# Patient Record
Sex: MALE | Race: BLACK OR AFRICAN AMERICAN | NOT HISPANIC OR LATINO | Employment: FULL TIME | URBAN - METROPOLITAN AREA
[De-identification: names, ages, dates, MRNs, and addresses within clinical notes are randomized per-mention and may not be internally consistent; named-entity substitution may affect disease eponyms.]

---

## 2018-09-05 ENCOUNTER — OFFICE VISIT (OUTPATIENT)
Dept: URGENT CARE | Facility: CLINIC | Age: 48
End: 2018-09-05
Payer: COMMERCIAL

## 2018-09-05 VITALS
DIASTOLIC BLOOD PRESSURE: 78 MMHG | WEIGHT: 234.2 LBS | RESPIRATION RATE: 18 BRPM | OXYGEN SATURATION: 97 % | HEIGHT: 75 IN | TEMPERATURE: 98.4 F | BODY MASS INDEX: 29.12 KG/M2 | SYSTOLIC BLOOD PRESSURE: 132 MMHG | HEART RATE: 71 BPM

## 2018-09-05 DIAGNOSIS — M54.50 ACUTE BILATERAL LOW BACK PAIN WITHOUT SCIATICA: Primary | ICD-10-CM

## 2018-09-05 PROCEDURE — G0382 LEV 3 HOSP TYPE B ED VISIT: HCPCS | Performed by: PHYSICIAN ASSISTANT

## 2018-09-05 RX ORDER — CYCLOBENZAPRINE HCL 10 MG
10 TABLET ORAL 3 TIMES DAILY PRN
Qty: 20 TABLET | Refills: 0 | Status: SHIPPED | OUTPATIENT
Start: 2018-09-05 | End: 2019-12-27 | Stop reason: SDUPTHER

## 2018-09-05 RX ORDER — IBUPROFEN 600 MG/1
600 TABLET ORAL EVERY 6 HOURS PRN
Qty: 20 TABLET | Refills: 0 | Status: SHIPPED | OUTPATIENT
Start: 2018-09-05

## 2018-09-05 NOTE — PATIENT INSTRUCTIONS
1  Low back pain  -X-ray as reviewed by myself is negative for acute abnormality- if the radiology read differs I will call you  -Apply warm, moist heat  -Gentle stretching  -Use Ibuprofen every 6-8 hours with food  -Use muscle relaxer as directed- do not drive or work while taking this  -Follow-up with PCP within 1 week    Go to ER with worsening symptoms, worsening pain, fever, numbness/tingling, bowel/bladder incontinence, weakness, or any new concerns    Acute Low Back Pain   AMBULATORY CARE:   Acute low back pain  is sudden discomfort in your lower back area that lasts for up to 6 weeks  The discomfort makes it difficult to tolerate activity  Common symptoms include the following:   · Back stiffness or spasms    · Pain down the back or side of one leg    · Holding yourself in an unusual position or posture to decrease your back pain    · Not being able to find a sitting position that is comfortable    · Slow increase in your pain for 24 to 48 hours after you stress your back    · Tenderness on your lower back or severe pain when you move your back  Seek immediate care for the following symptoms:   · Severe pain    · Sudden stiffness and heaviness in both buttocks down to both legs    · Numbness or weakness in one leg, or pain in both legs    · Numbness in your genital area or across your lower back    · Unable to control your urine or bowel movements  Contact your healthcare provider if:   · You have a fever  · You have pain at night or when you rest     · Your pain does not get better with treatment  · You have pain that worsens when you cough or sneeze  · You suddenly feel something pop or snap in your back  · You have questions or concerns about your condition or care  The goal of treatment for acute low back pain  is to relieve your pain and help you tolerate activity  Most people with acute lower back pain get better within 4 to 6 weeks   You may need any of the following:  · Acetaminophen  decreases pain  It is available without a doctor's order  Ask how much to take and how often to take it  Follow directions  Acetaminophen can cause liver damage if not taken correctly  · NSAIDs  help decrease swelling and pain  This medicine is available with or without a doctor's order  NSAIDs can cause stomach bleeding or kidney problems in certain people  If you take blood thinner medicine, always ask your healthcare provider if NSAIDs are safe for you  Always read the medicine label and follow directions  · Prescription pain medicine  may be given  Ask your healthcare provider how to take this medicine safely  · Muscle relaxers  decrease pain by relaxing the muscles in your lower spine  Manage your symptoms:   · Stay active  as much as you can without causing more pain  Bed rest could make your back pain worse  Start with some light exercises such as walking  Avoid heavy lifting until your pain is gone  Ask for more information about the activities or exercises that are right for you  · Ice  helps decrease swelling, pain, and muscle spams  Put crushed ice in a plastic bag  Cover it with a towel  Place it on your lower back for 20 to 30 minutes every 2 hours  Do this for about 2 to 3 days after your pain starts, or as directed  · Heat  helps decrease pain and muscle spasms  Start to use heat after treatment with ice has stopped  Use a small towel dampened with warm water or a heating pad, or sit in a warm bath  Apply heat on the area for 20 to 30 minutes every 2 hours for as many days as directed  Alternate heat and ice  Prevent acute low back pain:   · Use proper body mechanics  ¨ Bend at the hips and knees when you  objects  Do not bend from the waist  Use your leg muscles as you lift the load  Do not use your back  Keep the object close to your chest as you lift it   Try not to twist or lift anything above your waist     ¨ Change your position often when you stand for long periods of time  Rest one foot on a small box or footrest, and then switch to the other foot often  ¨ Try not to sit for long periods of time  When you do, sit in a straight-backed chair with your feet flat on the floor  Never reach, pull, or push while you are sitting  · Do exercises that strengthen your back muscles  Warm up before you exercise  Ask your healthcare provider the best exercises for you  · Maintain a healthy weight  Ask your healthcare provider how much you should weigh  Ask him to help you create a weight loss plan if you are overweight  Follow up with your healthcare provider as directed:  Return for a follow-up visit if you still have pain after 1 to 3 weeks of treatment  You may need to visit an orthopedist if your back pain lasts more than 12 weeks  Write down your questions so you remember to ask them during your visits  © 2017 [employer] Information is for End User's use only and may not be sold, redistributed or otherwise used for commercial purposes  All illustrations and images included in CareNotes® are the copyrighted property of A D [employer] M [employer]  or James Olivo  The above information is an  only  It is not intended as medical advice for individual conditions or treatments  Talk to your doctor, nurse or pharmacist before following any medical regimen to see if it is safe and effective for you
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
## 2019-10-10 ENCOUNTER — OFFICE VISIT (OUTPATIENT)
Dept: GASTROENTEROLOGY | Facility: CLINIC | Age: 49
End: 2019-10-10
Payer: COMMERCIAL

## 2019-10-10 VITALS
SYSTOLIC BLOOD PRESSURE: 130 MMHG | BODY MASS INDEX: 30.34 KG/M2 | HEART RATE: 78 BPM | WEIGHT: 244 LBS | HEIGHT: 75 IN | DIASTOLIC BLOOD PRESSURE: 70 MMHG

## 2019-10-10 DIAGNOSIS — R10.9 ABDOMINAL PAIN, UNSPECIFIED ABDOMINAL LOCATION: ICD-10-CM

## 2019-10-10 DIAGNOSIS — K64.9 HEMORRHOIDS, UNSPECIFIED HEMORRHOID TYPE: Primary | ICD-10-CM

## 2019-10-10 PROCEDURE — 99214 OFFICE O/P EST MOD 30 MIN: CPT | Performed by: PHYSICIAN ASSISTANT

## 2019-10-10 RX ORDER — OMEPRAZOLE 40 MG/1
40 CAPSULE, DELAYED RELEASE ORAL DAILY
COMMUNITY
Start: 2019-04-03

## 2019-10-10 RX ORDER — ALBUTEROL SULFATE 90 UG/1
2 AEROSOL, METERED RESPIRATORY (INHALATION) EVERY 6 HOURS PRN
COMMUNITY
Start: 2019-03-29 | End: 2020-03-28

## 2019-10-10 RX ORDER — HYDROCORTISONE ACETATE 25 MG/1
25 SUPPOSITORY RECTAL 2 TIMES DAILY
Qty: 12 SUPPOSITORY | Refills: 0 | Status: SHIPPED | OUTPATIENT
Start: 2019-10-10

## 2019-10-10 RX ORDER — IBUPROFEN 800 MG/1
800 TABLET ORAL
Refills: 0 | COMMUNITY
Start: 2019-08-30

## 2019-10-10 NOTE — PATIENT INSTRUCTIONS
Hemorrhoids   WHAT YOU NEED TO KNOW:   Hemorrhoids are swollen blood vessels inside your rectum (internal hemorrhoids) or on your anus (external hemorrhoids)  Sometimes a hemorrhoid may prolapse  This means it extends out of your anus  DISCHARGE INSTRUCTIONS:   Return to the emergency department if:   · You have severe pain in your rectum or around your anus  · You have severe pain in your abdomen and you are vomiting  · You have bleeding from your anus that soaks through your underwear  Contact your healthcare provider if:   · You have frequent and painful bowel movements  · Your hemorrhoid looks or feels more swollen than usual      · You do not have a bowel movement for 2 days or more  · You see or feel tissue coming through your anus  · You have questions or concerns about your condition or care  Medicines: You may  need any of the following:  · A pad, cream, or ointment  can help decrease pain, swelling, and itching  · Stool softeners  help treat or prevent constipation  · NSAIDs , such as ibuprofen, help decrease swelling, pain, and fever  NSAIDs can cause stomach bleeding or kidney problems in certain people  If you take blood thinner medicine, always ask your healthcare provider if NSAIDs are safe for you  Always read the medicine label and follow directions  · Take your medicine as directed  Contact your healthcare provider if you think your medicine is not helping or if you have side effects  Tell him or her if you are allergic to any medicine  Keep a list of the medicines, vitamins, and herbs you take  Include the amounts, and when and why you take them  Bring the list or the pill bottles to follow-up visits  Carry your medicine list with you in case of an emergency  Manage your symptoms:   · Apply ice on your anus for 15 to 20 minutes every hour or as directed  Use an ice pack, or put crushed ice in a plastic bag   Cover it with a towel before you apply it to your anus  Ice helps prevent tissue damage and decreases swelling and pain  · Take a sitz bath  Fill a bathtub with 4 to 6 inches of warm water  You may also use a sitz bath pan that fits inside a toilet bowl  Sit in the sitz bath for 15 minutes  Do this 3 times a day, and after each bowel movement  The warm water can help decrease pain and swelling  · Keep your anal area clean  Gently wash the area with warm water daily  Soap may irritate the area  After a bowel movement, wipe with moist towelettes or wet toilet paper  Dry toilet paper can irritate the area  Prevent hemorrhoids:   · Do not strain to have a bowel movement  Do not sit on the toilet too long  These actions can increase pressure on the tissues in your rectum and anus  · Drink plenty of liquids  Liquids can help prevent constipation  Ask how much liquid to drink each day and which liquids are best for you  · Eat a variety of high-fiber foods  Examples include fruits, vegetables, and whole grains  Ask your healthcare provider how much fiber you need each day  You may need to take a fiber supplement  · Exercise as directed  Exercise, such as walking, may make it easier to have a bowel movement  Ask your healthcare provider to help you create an exercise plan  · Do not have anal sex  Anal sex can weaken the skin around your rectum and anus  · Avoid heavy lifting  This can cause straining and increase your risk for another hemorrhoid  Follow up with your healthcare provider as directed:  Write down your questions so you remember to ask them during your visits  © 2017 [facility] [facility] Information is for End User's use only and may not be sold, redistributed or otherwise used for commercial purposes  All illustrations and images included in CareNotes® are the copyrighted property of [employer]  or James Olivo  The above information is an  only   It is not intended as medical advice for individual conditions or treatments  Talk to your doctor, nurse or pharmacist before following any medical regimen to see if it is safe and effective for you

## 2019-10-10 NOTE — PROGRESS NOTES
[facility] - Outpatient Follow-up Note  Jesús Serrano 57 y o  male MRN: [id number]  Encounter: [id number]          ASSESSMENT AND PLAN:      1  Hemorrhoids, unspecified hemorrhoid type  2  Abdominal pain, unspecified abdominal location  Will start Anusol suppositories q h s  For 14 days  Will start Anusol cream twice a day for 14 days  Will get CT abdomen pelvis with contrast due to the fact the patient has been having ongoing abdominal pain radiating to the pelvic region with a negative ultrasound  No repeat plans for colonoscopy   ______________________________________________________________________    SUBJECTIVE:    57-year-old male who is here with a history of hemorrhoids as well as abdominal pain  Patient underwent colonoscopy last year when he was evaluated by our practice and colonoscopy was normal   Patient reports that over the last several months he has been having were symptoms related to his hemorrhoids as well as periumbilical abdominal pain  Patient reports that when I had prescribed him treatment for his hemorrhoids last year he did not go through with this treatment  He denies any bleeding  He does report rectal pain and irritation  He does report that he spends a lot of time sitting on the toilet  He denies any significant diarrhea or constipation problems  He denies any nausea or vomiting  He does report that this periumbilical abdominal pain radiates to pelvic area  He reports that there is no correlation to the cause of his pain  REVIEW OF SYSTEMS IS OTHERWISE NEGATIVE        Historical Information   Past Medical History:   Diagnosis Date    Asthma      Past Surgical History:   Procedure Laterality Date    COLONOSCOPY       Social History   Social History     Substance and Sexual Activity   Alcohol Use Yes    Comment: socially     Social History     Substance and Sexual Activity   Drug Use No     Social History     Tobacco Use   Smoking Status Never Smoker   Smokeless Tobacco Never Used     Family History   Problem Relation Age of Onset    Diabetes Mother     Cancer Father        Meds/Allergies       Current Outpatient Medications:     albuterol (PROVENTIL HFA,VENTOLIN HFA) 90 mcg/act inhaler    cyclobenzaprine (FLEXERIL) 10 mg tablet    hydrocortisone (ANUSOL-HC) 25 mg suppository    hydrocortisone (ANUSOL-HC, PROCTOSOL HC,) 2 5 % rectal cream    ibuprofen (MOTRIN) 600 mg tablet    ibuprofen (MOTRIN) 800 mg tablet    omeprazole (PriLOSEC) 40 MG capsule    No Known Allergies        Objective     Blood pressure 130/70, pulse 78, height 6' 3" (1 905 m), weight 111 kg (244 lb)  Body mass index is 30 5 kg/m²  PHYSICAL EXAM:      General Appearance:   Alert, cooperative, no distress   HEENT:   Normocephalic, atraumatic, anicteric      Neck:  Supple, symmetrical, trachea midline   Lungs:   Clear to auscultation bilaterally; no rales, rhonchi or wheezing; respirations unlabored    Heart[de-identified]   Regular rate and rhythm; no murmur, rub, or gallop  Abdomen:   Soft, non-tender, non-distended; normal bowel sounds; no masses, no organomegaly    Genitalia:   Deferred    Rectal:   Deferred    Extremities:  No cyanosis, clubbing or edema    Pulses:  2+ and symmetric    Skin:  No jaundice, rashes, or lesions    Lymph nodes:  No palpable cervical lymphadenopathy        Lab Results:   No visits with results within 1 Day(s) from this visit  Latest known visit with results is:   No results found for any previous visit  Radiology Results:   No results found

## 2019-10-15 ENCOUNTER — TELEPHONE (OUTPATIENT)
Dept: GASTROENTEROLOGY | Facility: CLINIC | Age: 49
End: 2019-10-15

## 2019-10-15 NOTE — TELEPHONE ENCOUNTER
Called ins to get precert on the CT scan and ins is requiring additional clinicals or peer to peer  Requested US from [facility]  Faxed US and office notes to [employer] Fax# [phone]  Pending response

## 2019-10-16 NOTE — TELEPHONE ENCOUNTER
[facility] denied CT scan  If you would like to provide necessary info by phone , please contact MARCEL at [phone] by 10/17/19 at 5 pm     Please advise   Ty

## 2019-10-16 NOTE — TELEPHONE ENCOUNTER
Lets please cancel the CT for now and see how he does in the next week with treatment provided Thanks  He will likely need a repeat abdominal and pelvic US as well  Thanks

## 2019-10-16 NOTE — TELEPHONE ENCOUNTER
Cancelled CT  Called LMURMILA advising the below  Also told him to call us back in 1 week or 2 to let us know how he is doing

## 2019-10-21 NOTE — TELEPHONE ENCOUNTER
[facility] called lmom - Did we want to cancel CT Scan Abd/contrast for patient  Still showing active  If so, please call and let them know, also does patient know   Please call [phone]

## 2019-11-05 ENCOUNTER — TELEPHONE (OUTPATIENT)
Dept: GASTROENTEROLOGY | Facility: CLINIC | Age: 49
End: 2019-11-05

## 2019-11-05 NOTE — TELEPHONE ENCOUNTER
Pt is requesting an explanation why the CT was denied  [employer] wants to know if we are going to do a peer to peer  If so, please call [phone], ext [phone]  Otherwise please advise alternative approach  Thanks

## 2019-11-05 NOTE — TELEPHONE ENCOUNTER
Luis E pt - [facility] called, CT was denied, [employer] wants to know if we are going to try to re-request with new info  Pt wants to know why it was denied  Please call [phone], ext [phone]   Ty

## 2019-11-05 NOTE — TELEPHONE ENCOUNTER
I called [employer] and left a message for call back todo a peer to peer  Please inform patient that we are working on this Thanks!

## 2019-11-08 ENCOUNTER — TELEPHONE (OUTPATIENT)
Dept: GASTROENTEROLOGY | Facility: CLINIC | Age: 49
End: 2019-11-08

## 2019-11-08 NOTE — TELEPHONE ENCOUNTER
Luis E pt-  Patient would like to know the status of the Cat Scan and or if, another procedure will be ordered since the insurance denied the initial request  Please phone patient @ [phone] to advise

## 2019-11-08 NOTE — TELEPHONE ENCOUNTER
LMOM advised Ct scan insurance denied but we can do an US and to let us know and if they are feeling better/

## 2019-11-12 ENCOUNTER — TELEPHONE (OUTPATIENT)
Dept: GASTROENTEROLOGY | Facility: CLINIC | Age: 49
End: 2019-11-12

## 2019-11-12 NOTE — TELEPHONE ENCOUNTER
Pt called about Ct scan--explained it was denied and we can do US pt said he had an US and nothing found    Pt will call insurance

## 2019-12-27 DIAGNOSIS — M54.50 ACUTE BILATERAL LOW BACK PAIN WITHOUT SCIATICA: ICD-10-CM

## 2019-12-27 DIAGNOSIS — K64.9 HEMORRHOIDS, UNSPECIFIED HEMORRHOID TYPE: ICD-10-CM

## 2019-12-27 RX ORDER — CYCLOBENZAPRINE HCL 10 MG
10 TABLET ORAL 3 TIMES DAILY PRN
Qty: 20 TABLET | Refills: 0 | Status: SHIPPED | OUTPATIENT
Start: 2019-12-27

## 2019-12-27 NOTE — TELEPHONE ENCOUNTER
Luis E pt - pt would like an order for an US to be put in so that he can get it done, please call pt when it is in the system so he can schedule it  Pt also needs a refill of the rectal cream and felxeril called into [facility] in [facility]

## 2020-04-15 ENCOUNTER — TELEPHONE (OUTPATIENT)
Dept: GASTROENTEROLOGY | Facility: CLINIC | Age: 50
End: 2020-04-15

## 2020-04-20 ENCOUNTER — TELEMEDICINE (OUTPATIENT)
Dept: GASTROENTEROLOGY | Facility: CLINIC | Age: 50
End: 2020-04-20
Payer: COMMERCIAL

## 2020-04-20 DIAGNOSIS — R10.9 ABDOMINAL PAIN, UNSPECIFIED ABDOMINAL LOCATION: Primary | ICD-10-CM

## 2020-04-20 DIAGNOSIS — K64.9 HEMORRHOIDS, UNSPECIFIED HEMORRHOID TYPE: ICD-10-CM

## 2020-04-20 PROCEDURE — 99443 PR PHYS/QHP TELEPHONE EVALUATION 21-30 MIN: CPT | Performed by: PHYSICIAN ASSISTANT

## 2020-04-20 RX ORDER — DICYCLOMINE HCL 20 MG
20 TABLET ORAL EVERY 6 HOURS
Qty: 40 TABLET | Refills: 2 | Status: SHIPPED | OUTPATIENT
Start: 2020-04-20

## 2020-05-14 ENCOUNTER — APPOINTMENT (OUTPATIENT)
Dept: URGENT CARE | Facility: CLINIC | Age: 50
End: 2020-05-14
Payer: OTHER MISCELLANEOUS

## 2020-05-14 ENCOUNTER — APPOINTMENT (OUTPATIENT)
Dept: RADIOLOGY | Facility: CLINIC | Age: 50
End: 2020-05-14
Payer: OTHER MISCELLANEOUS

## 2020-05-14 ENCOUNTER — TRANSCRIBE ORDERS (OUTPATIENT)
Dept: URGENT CARE | Facility: CLINIC | Age: 50
End: 2020-05-14

## 2020-05-14 DIAGNOSIS — S89.92XA INJURY OF LEFT LOWER EXTREMITY, INITIAL ENCOUNTER: Primary | ICD-10-CM

## 2020-05-14 DIAGNOSIS — S89.92XA INJURY OF LEFT LOWER EXTREMITY, INITIAL ENCOUNTER: ICD-10-CM

## 2020-05-14 PROCEDURE — 73610 X-RAY EXAM OF ANKLE: CPT

## 2020-05-14 PROCEDURE — 73590 X-RAY EXAM OF LOWER LEG: CPT

## 2020-05-14 PROCEDURE — 99283 EMERGENCY DEPT VISIT LOW MDM: CPT

## 2020-05-14 PROCEDURE — G0382 LEV 3 HOSP TYPE B ED VISIT: HCPCS

## 2020-05-18 ENCOUNTER — OFFICE VISIT (OUTPATIENT)
Dept: OBGYN CLINIC | Facility: CLINIC | Age: 50
End: 2020-05-18
Payer: OTHER MISCELLANEOUS

## 2020-05-18 VITALS
DIASTOLIC BLOOD PRESSURE: 82 MMHG | TEMPERATURE: 98.7 F | BODY MASS INDEX: 30.5 KG/M2 | SYSTOLIC BLOOD PRESSURE: 122 MMHG | HEIGHT: 75 IN

## 2020-05-18 DIAGNOSIS — S82.392A CLOSED FRACTURE OF POSTERIOR MALLEOLUS OF LEFT TIBIA, INITIAL ENCOUNTER: Primary | ICD-10-CM

## 2020-05-18 PROCEDURE — 99203 OFFICE O/P NEW LOW 30 MIN: CPT | Performed by: ORTHOPAEDIC SURGERY

## 2020-05-18 PROCEDURE — 27767 CLTX POST ANKLE FX: CPT | Performed by: ORTHOPAEDIC SURGERY

## 2020-05-21 ENCOUNTER — TELEPHONE (OUTPATIENT)
Dept: OBGYN CLINIC | Facility: CLINIC | Age: 50
End: 2020-05-21

## 2020-05-26 ENCOUNTER — TELEPHONE (OUTPATIENT)
Dept: OBGYN CLINIC | Facility: CLINIC | Age: 50
End: 2020-05-26

## 2020-06-02 ENCOUNTER — OFFICE VISIT (OUTPATIENT)
Dept: OBGYN CLINIC | Facility: CLINIC | Age: 50
End: 2020-06-02

## 2020-06-02 ENCOUNTER — APPOINTMENT (OUTPATIENT)
Dept: RADIOLOGY | Facility: CLINIC | Age: 50
End: 2020-06-02
Payer: OTHER MISCELLANEOUS

## 2020-06-02 VITALS — SYSTOLIC BLOOD PRESSURE: 118 MMHG | DIASTOLIC BLOOD PRESSURE: 72 MMHG

## 2020-06-02 DIAGNOSIS — S82.392A CLOSED FRACTURE OF POSTERIOR MALLEOLUS OF LEFT TIBIA, INITIAL ENCOUNTER: ICD-10-CM

## 2020-06-02 DIAGNOSIS — S82.392D CLOSED FRACTURE OF POSTERIOR MALLEOLUS OF LEFT TIBIA WITH ROUTINE HEALING, SUBSEQUENT ENCOUNTER: Primary | ICD-10-CM

## 2020-06-02 PROCEDURE — 99024 POSTOP FOLLOW-UP VISIT: CPT | Performed by: ORTHOPAEDIC SURGERY

## 2020-06-02 PROCEDURE — 73610 X-RAY EXAM OF ANKLE: CPT

## 2020-06-30 ENCOUNTER — APPOINTMENT (OUTPATIENT)
Dept: RADIOLOGY | Facility: CLINIC | Age: 50
End: 2020-06-30
Payer: OTHER MISCELLANEOUS

## 2020-06-30 ENCOUNTER — OFFICE VISIT (OUTPATIENT)
Dept: OBGYN CLINIC | Facility: CLINIC | Age: 50
End: 2020-06-30

## 2020-06-30 VITALS
TEMPERATURE: 98.7 F | BODY MASS INDEX: 30 KG/M2 | DIASTOLIC BLOOD PRESSURE: 68 MMHG | SYSTOLIC BLOOD PRESSURE: 120 MMHG | WEIGHT: 240 LBS

## 2020-06-30 DIAGNOSIS — S82.392D CLOSED FRACTURE OF POSTERIOR MALLEOLUS OF LEFT TIBIA WITH ROUTINE HEALING, SUBSEQUENT ENCOUNTER: Primary | ICD-10-CM

## 2020-06-30 DIAGNOSIS — S82.392D CLOSED FRACTURE OF POSTERIOR MALLEOLUS OF LEFT TIBIA WITH ROUTINE HEALING, SUBSEQUENT ENCOUNTER: ICD-10-CM

## 2020-06-30 PROCEDURE — 99024 POSTOP FOLLOW-UP VISIT: CPT | Performed by: ORTHOPAEDIC SURGERY

## 2020-06-30 PROCEDURE — 73610 X-RAY EXAM OF ANKLE: CPT

## 2020-07-09 ENCOUNTER — EVALUATION (OUTPATIENT)
Dept: PHYSICAL THERAPY | Age: 50
End: 2020-07-09
Payer: OTHER MISCELLANEOUS

## 2020-07-09 DIAGNOSIS — S82.392D CLOSED FRACTURE OF POSTERIOR MALLEOLUS OF LEFT TIBIA WITH ROUTINE HEALING, SUBSEQUENT ENCOUNTER: Primary | ICD-10-CM

## 2020-07-09 PROCEDURE — 97022 WHIRLPOOL THERAPY: CPT | Performed by: PHYSICAL THERAPIST

## 2020-07-09 PROCEDURE — 97161 PT EVAL LOW COMPLEX 20 MIN: CPT | Performed by: PHYSICAL THERAPIST

## 2020-07-09 PROCEDURE — 97140 MANUAL THERAPY 1/> REGIONS: CPT | Performed by: PHYSICAL THERAPIST

## 2020-07-09 PROCEDURE — 97110 THERAPEUTIC EXERCISES: CPT | Performed by: PHYSICAL THERAPIST

## 2020-07-15 ENCOUNTER — OFFICE VISIT (OUTPATIENT)
Dept: PHYSICAL THERAPY | Age: 50
End: 2020-07-15
Payer: OTHER MISCELLANEOUS

## 2020-07-15 DIAGNOSIS — S82.392D CLOSED FRACTURE OF POSTERIOR MALLEOLUS OF LEFT TIBIA WITH ROUTINE HEALING, SUBSEQUENT ENCOUNTER: Primary | ICD-10-CM

## 2020-07-15 PROCEDURE — 97110 THERAPEUTIC EXERCISES: CPT

## 2020-07-15 PROCEDURE — 97022 WHIRLPOOL THERAPY: CPT

## 2020-07-15 NOTE — PROGRESS NOTES
Daily Note     Today's date: 7/15/2020  Patient name: Leonie Camacho  : 1970  MRN: [id number]  Referring provider: Eugene Ulrich MD  Dx:   Encounter Diagnosis     ICD-10-CM    1  Closed fracture of posterior malleolus of left tibia with routine healing, subsequent encounter S82 392D        Start Time: 1100  Stop Time: 1200  Total time in clinic (min): 60 minutes    Subjective: Reports 7/10 pain at R ankle       Objective: See treatment diary below      Assessment: Tolerated treatment well  Discomfort noted with PROM and active exercises involving ankle DF and eversion, but tolerable  Decreased ROM noted t/o L ankle  Good understanding of exercises, provided written HEP  No c/o increased pain post session  Patient would benefit from continued PT      Plan: Continue per plan of care        Precautions: standard      Manuals 7/9 7/15           Left ankle foot 10' 10'                                     Neuro Re-Ed                                                                 Ther Ex             Towel curls 10x HEP           Towel slides inv/ever  20x                         AROM all planes 10x 20x           slant L2  30"/3 L2 30''x3           Disc fwrd/bwrd 10x 20x           Disc CW/CCW 10x 20x           isometrics  10''x10           Bike 5 6'                        Ther Activity                                       Gait Training                                       Modalities             FWP 10 10'           Ice post PRN             HEP: ankle isometrics, towel slides inv/ever, step calf stretch

## 2020-07-16 NOTE — PROGRESS NOTES
Daily Note     Today's date: 2020  Patient name: Louie Allen  : 1970  MRN: [id number]  Referring provider: Zeyad Crawford MD  Dx:   Encounter Diagnosis     ICD-10-CM    1  Closed fracture of posterior malleolus of left tibia with routine healing, subsequent encounter S82 392D        Start Time: 0800  Stop Time: 0900  Total time in clinic (min): 60 minutes    Subjective: "It is getting a little better " Notes doing HEP with fair tolerance  Objective: See treatment diary below      Assessment: Tolerated treatment well  Patient would benefit from continued PT Ambulation without assistive device  Continues with decreased push off through stance  Tender mid lateral calf  Better AROM through range  Weak to manual resistance  Plan: Continue per plan of care        Precautions: standard      Manuals 7/9 7/15 7/17          Left ankle foot 10' 10' 10'                                    Neuro Re-Ed                                                                 Ther Ex             Towel curls 10x HEP 10x          Towel slides inv/ever  20x  20x                       AROM all planes 10x 20x 20x          slant L2  30"/3 L2 30''x3 3x          Disc fwrd/bwrd 10x 20x 30x          Disc CW/CCW 10x 20x 30x          isometrics  10''x10 MRE  10x          Bike 5 6'                        SLS L   10"/  10x          Calf raises   10x          Ther Activity                                       Gait Training                                       Modalities             FWP 10 10' 10          Ice post PRN             HEP: ankle isometrics, towel slides inv/ever, step calf stretch

## 2020-07-17 ENCOUNTER — OFFICE VISIT (OUTPATIENT)
Dept: PHYSICAL THERAPY | Age: 50
End: 2020-07-17
Payer: OTHER MISCELLANEOUS

## 2020-07-17 DIAGNOSIS — S82.392D CLOSED FRACTURE OF POSTERIOR MALLEOLUS OF LEFT TIBIA WITH ROUTINE HEALING, SUBSEQUENT ENCOUNTER: Primary | ICD-10-CM

## 2020-07-17 PROCEDURE — 97110 THERAPEUTIC EXERCISES: CPT | Performed by: PHYSICAL THERAPIST

## 2020-07-17 PROCEDURE — 97022 WHIRLPOOL THERAPY: CPT | Performed by: PHYSICAL THERAPIST

## 2020-07-17 PROCEDURE — 97140 MANUAL THERAPY 1/> REGIONS: CPT | Performed by: PHYSICAL THERAPIST

## 2020-07-21 ENCOUNTER — APPOINTMENT (OUTPATIENT)
Dept: PHYSICAL THERAPY | Age: 50
End: 2020-07-21
Payer: OTHER MISCELLANEOUS

## 2020-07-22 ENCOUNTER — OFFICE VISIT (OUTPATIENT)
Dept: PHYSICAL THERAPY | Age: 50
End: 2020-07-22
Payer: OTHER MISCELLANEOUS

## 2020-07-22 DIAGNOSIS — S82.392D CLOSED FRACTURE OF POSTERIOR MALLEOLUS OF LEFT TIBIA WITH ROUTINE HEALING, SUBSEQUENT ENCOUNTER: Primary | ICD-10-CM

## 2020-07-22 PROCEDURE — 97140 MANUAL THERAPY 1/> REGIONS: CPT | Performed by: PHYSICAL THERAPIST

## 2020-07-22 PROCEDURE — 97110 THERAPEUTIC EXERCISES: CPT | Performed by: PHYSICAL THERAPIST

## 2020-07-22 PROCEDURE — 97022 WHIRLPOOL THERAPY: CPT | Performed by: PHYSICAL THERAPIST

## 2020-07-22 NOTE — PROGRESS NOTES
Daily Note     Today's date: 2020  Patient name: Merlin Baker  : 1970  MRN: [id number]  Referring provider: Jimmye Dakin, MD  Dx:   Encounter Diagnosis     ICD-10-CM    1  Closed fracture of posterior malleolus of left tibia with routine healing, subsequent encounter S82 392D        Start Time: 4668  Stop Time: 9018  Total time in clinic (min): 61 minutes    Subjective: Patient notes pain persists and varies depending on activity level  Has been trying to walk more at home  Notes guarded to walking in grass and on uneven ground  Objective: See treatment diary below      Assessment: Tolerated treatment well  Limited AROM and guarded to end range PROM  Antalgic gait with decreased push off  Pain persists  Patient would benefit from continued PT  Calf atrophy left versus right  Plan: Continue per plan of care        Precautions: standard      Manuals 7/9 7/15 7/17 7/22         Left ankle foot 10' 10' 10' 10                                   Neuro Re-Ed                                                                 Ther Ex             Towel curls 10x HEP 10x 10x         Towel slides inv/ever  20x  20x 20x                      AROM all planes 10x 20x 20x 20x         slant L2  30"/3 L2 30''x3 3x 4x         Disc fwrd/bwrd 10x 20x 30x 30         Disc CW/CCW 10x 20x 30x 30         isometrics  10''x10 MRE  10x 10x         Bike 5 6'  10                      SLS L   10"/  10x 10"/10x         Calf raises   10x 20x         Ther Activity                                       Gait Training                                       Modalities             FWP 10 10' 10 10         Ice post PRN             HEP: ankle isometrics, towel slides inv/ever, step calf stretch

## 2020-07-24 ENCOUNTER — OFFICE VISIT (OUTPATIENT)
Dept: PHYSICAL THERAPY | Age: 50
End: 2020-07-24
Payer: OTHER MISCELLANEOUS

## 2020-07-24 DIAGNOSIS — S82.392D CLOSED FRACTURE OF POSTERIOR MALLEOLUS OF LEFT TIBIA WITH ROUTINE HEALING, SUBSEQUENT ENCOUNTER: Primary | ICD-10-CM

## 2020-07-24 PROCEDURE — 97140 MANUAL THERAPY 1/> REGIONS: CPT | Performed by: PHYSICAL THERAPIST

## 2020-07-24 PROCEDURE — 97022 WHIRLPOOL THERAPY: CPT | Performed by: PHYSICAL THERAPIST

## 2020-07-24 PROCEDURE — 97110 THERAPEUTIC EXERCISES: CPT | Performed by: PHYSICAL THERAPIST

## 2020-07-24 NOTE — PROGRESS NOTES
Daily Note     Today's date: 2020  Patient name: Denny Fontaine  : 1970  MRN: [id number]  Referring provider: Hannah Ramirez MD  Dx:   Encounter Diagnosis     ICD-10-CM    1  Closed fracture of posterior malleolus of left tibia with routine healing, subsequent encounter S82 392D        Start Time: 1300  Stop Time: 1400  Total time in clinic (min): 60 minutes    Subjective: Patient notes feeling a little less pain today  Riding bike at home  Objective: See treatment diary below      Assessment: Tolerated treatment well  Patient would benefit from continued PT Antalgic gait with decreased push off  Calf raises difficult  Pain persists  Minimal to no edema left ankle  Plan: Continue per plan of care        Precautions: standard      Manuals 7/9 7/15 7/17 7/22 7/24        Left ankle foot 10' 10' 10' 10 10                                  Neuro Re-Ed                                                                 Ther Ex             Towel curls 10x HEP 10x 10x 10x        Towel slides inv/ever  20x  20x 20x 20x                     AROM all planes 10x 20x 20x 20x 20x        slant L2  30"/3 L2 30''x3 3x 4x 4x        Disc fwrd/bwrd 10x 20x 30x 30 30        Disc CW/CCW 10x 20x 30x 30 30        isometrics  10''x10 MRE  10x 10x 10x  MRE        Bike 5 6'  10 10                     SLS L   10"/  10x 10"/10x 10"/10  Blue mat        Calf raises   10x 20x 30x        Ther Activity                                       Gait Training                                       Modalities             FWP 10 10' 10 10 10        Ice post PRN             HEP: ankle isometrics, towel slides inv/ever, step calf stretch

## 2020-07-28 ENCOUNTER — OFFICE VISIT (OUTPATIENT)
Dept: PHYSICAL THERAPY | Age: 50
End: 2020-07-28
Payer: OTHER MISCELLANEOUS

## 2020-07-28 DIAGNOSIS — S82.392D CLOSED FRACTURE OF POSTERIOR MALLEOLUS OF LEFT TIBIA WITH ROUTINE HEALING, SUBSEQUENT ENCOUNTER: Primary | ICD-10-CM

## 2020-07-28 PROCEDURE — 97140 MANUAL THERAPY 1/> REGIONS: CPT | Performed by: PHYSICAL THERAPIST

## 2020-07-28 PROCEDURE — 97022 WHIRLPOOL THERAPY: CPT | Performed by: PHYSICAL THERAPIST

## 2020-07-28 PROCEDURE — 97110 THERAPEUTIC EXERCISES: CPT | Performed by: PHYSICAL THERAPIST

## 2020-07-28 NOTE — PROGRESS NOTES
Daily Note     Today's date: 2020  Patient name: Lavonne Sparks  : 1970  MRN: [id number]  Referring provider: Beth Aceves MD  Dx:   Encounter Diagnosis     ICD-10-CM    1  Closed fracture of posterior malleolus of left tibia with routine healing, subsequent encounter S82 392D        Start Time: 1300  Stop Time: 1400  Total time in clinic (min): 60 minutes    Subjective: Pt c/o increased pain for past couple days  Objective: See treatment diary below      Assessment: Tolerated treatment well  Patient has fairly good PROM, contniues to have pain with WB  Pt did lack smooth motor control with use of ankle isolator with noted eccentric weakness  Pt felt fatigue post session but no increased pain  Plan: Continue per plan of care        Precautions: standard      Manuals 7/9 7/15 7/17 7/22 7/24 7/28       Left ankle foot 10' 10' 10' 10 10 10                                 Neuro Re-Ed                                                                 Ther Ex             Towel curls 10x HEP 10x 10x 10x 20x       Towel slides inv/ever  20x  20x 20x 20x 30x                    AROM all planes 10x 20x 20x 20x 20x        slant L2  30"/3 L2 30''x3 3x 4x 4x 30"x4       Disc fwrd/bwrd 10x 20x 30x 30 30 30x ea       Disc CW/CCW 10x 20x 30x 30 30 30x ea       isometrics  10''x10 MRE  10x 10x 10x  MRE isolator       Bike 5 6'  10 10        Ankle isolator      2#df/pf  1# in/ev       SLS L   10"/  10x 10"/10x 10"/10  Blue mat 10"/10       Calf raises   10x 20x 30x 30       Ther Activity                                       Gait Training                                       Modalities             FWP 10 10' 10 10 10 10       Ice post PRN             HEP: ankle isometrics, towel slides inv/ever, step calf stretch

## 2020-07-31 ENCOUNTER — OFFICE VISIT (OUTPATIENT)
Dept: PHYSICAL THERAPY | Age: 50
End: 2020-07-31
Payer: OTHER MISCELLANEOUS

## 2020-07-31 DIAGNOSIS — S82.392D CLOSED FRACTURE OF POSTERIOR MALLEOLUS OF LEFT TIBIA WITH ROUTINE HEALING, SUBSEQUENT ENCOUNTER: Primary | ICD-10-CM

## 2020-07-31 PROCEDURE — 97140 MANUAL THERAPY 1/> REGIONS: CPT | Performed by: PHYSICAL THERAPIST

## 2020-07-31 PROCEDURE — 97022 WHIRLPOOL THERAPY: CPT | Performed by: PHYSICAL THERAPIST

## 2020-07-31 PROCEDURE — 97110 THERAPEUTIC EXERCISES: CPT | Performed by: PHYSICAL THERAPIST

## 2020-07-31 NOTE — PROGRESS NOTES
Daily Note     Today's date: 2020  Patient name: Young Gomez  : 1970  MRN: [id number]  Referring provider: Mila Vargas MD  Dx:   Encounter Diagnosis     ICD-10-CM    1  Closed fracture of posterior malleolus of left tibia with routine healing, subsequent encounter S82 392D        Start Time: 1300  Stop Time: 0013  Total time in clinic (min): 65 minutes    Subjective: Patient notes that as he  continues to do more he is having increased pain dorsal and lateral ankle and lateral lower leg  8/10 pain at time of arrival  Best pain level 6/10  Objective: See treatment diary below      Assessment: Tolerated treatment well  Patient would benefit from continued PT Calf atrophy left versus right  Decreased gait speed  Pain persists  Antalgic gait  Plan: Continue per plan of care        Precautions: standard      Manuals 7/9 7/15 7/17 7/22 7/24 7/28 7/31      Left ankle foot 10' 10' 10' 10 10 10 10                                Neuro Re-Ed                                                                 Ther Ex             Towel curls 10x HEP 10x 10x 10x 20x 20x      Towel slides inv/ever  20x  20x 20x 20x 30x 30x                   AROM all planes 10x 20x 20x 20x 20x  disc      slant L2  30"/3 L2 30''x3 3x 4x 4x 30"x4 30"/4x  L2      Disc fwrd/bwrd 10x 20x 30x 30 30 30x ea 30x      Disc CW/CCW 10x 20x 30x 30 30 30x ea 30x      isometrics  10''x10 MRE  10x 10x 10x  MRE isolator       Bike 5 6'  10 10        Ankle isolator      2#df/pf  1# in/ev 2#/30  1#/30      SLS L   10"/  10x 10"/10x 10"/10  Blue mat 10"/10 10x      Calf raises   10x 20x 30x 30 30x      Ther Activity                                       Gait Training                                       Modalities             FWP 10 10' 10 10 10 10 10      Ice post PRN             HEP: ankle isometrics, towel slides inv/ever, step calf stretch

## 2020-08-02 NOTE — PROGRESS NOTES
Daily Note     Today's date: 8/3/2020  Patient name: Rosalba Farnsworth  : 1970  MRN: [id number]  Referring provider: Nora Keith MD  Dx:   Encounter Diagnosis     ICD-10-CM    1  Closed fracture of posterior malleolus of left tibia with routine healing, subsequent encounter  S82 392D        Start Time: 1400  Stop Time: 1500  Total time in clinic (min): 60 minutes    Subjective: 6/10 general pain left ankle and lateral leg  "A little better " "I just want to get stronger "      Objective: See treatment diary below      Assessment: Tolerated treatment well  Patient would benefit from continued PT Slow gains in eccentric control inversion and eversion  Increased PRE weights with good tolerance  Plan: Continue per plan of care        Precautions: standard      Manuals 7/9 7/15 7/17 7/22 7/24 7/28 7/31 8/3     Left ankle foot 10' 10' 10' 10 10 10 10 10                               Neuro Re-Ed                                                                 Ther Ex             Towel curls 10x HEP 10x 10x 10x 20x 20x 20x     Towel slides inv/ever  20x  20x 20x 20x 30x 30x 30x                  AROM all planes 10x 20x 20x 20x 20x  disc      slant L2  30"/3 L2 30''x3 3x 4x 4x 30"x4 30"/4x  L2 30"/4x     Disc fwrd/bwrd 10x 20x 30x 30 30 30x ea 30x 30x     Disc CW/CCW 10x 20x 30x 30 30 30x ea 30x 30x     isometrics  10''x10 MRE  10x 10x 10x  MRE isolator       Bike 5 6'  10 10        Ankle isolator      2#df/pf  1# in/ev 2#/30  1#/30 2/30  All planes     SLS L  Blue mat   10"/  10x 10"/10x 10"/10  Blue mat 10"/10 10x 10"/  10x     Calf raises   10x 20x 30x 30 30x 30x     Calf press        nv     Leg Press        nv     Ther Activity                                       Gait Training                                       Modalities             FWP 10 10' 10 10 10 10 10 10     Ice post PRN             HEP: ankle isometrics, towel slides inv/ever, step calf stretch

## 2020-08-03 ENCOUNTER — OFFICE VISIT (OUTPATIENT)
Dept: PHYSICAL THERAPY | Age: 50
End: 2020-08-03
Payer: OTHER MISCELLANEOUS

## 2020-08-03 DIAGNOSIS — S82.392D CLOSED FRACTURE OF POSTERIOR MALLEOLUS OF LEFT TIBIA WITH ROUTINE HEALING, SUBSEQUENT ENCOUNTER: Primary | ICD-10-CM

## 2020-08-03 PROCEDURE — 97140 MANUAL THERAPY 1/> REGIONS: CPT | Performed by: PHYSICAL THERAPIST

## 2020-08-03 PROCEDURE — 97110 THERAPEUTIC EXERCISES: CPT | Performed by: PHYSICAL THERAPIST

## 2020-08-05 NOTE — PROGRESS NOTES
Daily Note     Today's date: 2020  Patient name: Brandon Karimi  : 1970  MRN: [id number]  Referring provider: Denys Hart MD  Dx:   Encounter Diagnosis     ICD-10-CM    1  Closed fracture of posterior malleolus of left tibia with routine healing, subsequent encounter  L06 483C                   Subjective: ***      Objective: See treatment diary below      Assessment: Tolerated treatment {Tolerated treatment :7471163410}   Patient {assessment:1978268880}      Plan: {PLAN:1720075984}     Precautions: standard      Manuals 7/9 7/15 7/17 7/22 7/24 7/28 7/31 8/3     Left ankle foot 10' 10' 10' 10 10 10 10 10                               Neuro Re-Ed                                                                 Ther Ex             Towel curls 10x HEP 10x 10x 10x 20x 20x 20x     Towel slides inv/ever  20x  20x 20x 20x 30x 30x 30x                  AROM all planes 10x 20x 20x 20x 20x  disc      slant L2  30"/3 L2 30''x3 3x 4x 4x 30"x4 30"/4x  L2 30"/4x     Disc fwrd/bwrd 10x 20x 30x 30 30 30x ea 30x 30x     Disc CW/CCW 10x 20x 30x 30 30 30x ea 30x 30x     isometrics  10''x10 MRE  10x 10x 10x  MRE isolator       Bike 5 6'  10 10        Ankle isolator      2#df/pf  1# in/ev 2#/30  1#/30 2/30  All planes     SLS L  Blue mat   10"/  10x 10"/10x 10"/10  Blue mat 10"/10 10x 10"/  10x     Calf raises   10x 20x 30x 30 30x 30x     Calf press        nv     Leg Press        nv     Ther Activity                                       Gait Training                                       Modalities             FWP 10 10' 10 10 10 10 10 10     Ice post PRN             HEP: ankle isometrics, towel slides inv/ever, step calf stretch

## 2020-08-06 ENCOUNTER — EVALUATION (OUTPATIENT)
Dept: PHYSICAL THERAPY | Age: 50
End: 2020-08-06
Payer: OTHER MISCELLANEOUS

## 2020-08-06 DIAGNOSIS — S82.392D CLOSED FRACTURE OF POSTERIOR MALLEOLUS OF LEFT TIBIA WITH ROUTINE HEALING, SUBSEQUENT ENCOUNTER: Primary | ICD-10-CM

## 2020-08-06 PROCEDURE — 97110 THERAPEUTIC EXERCISES: CPT | Performed by: PHYSICAL THERAPIST

## 2020-08-06 PROCEDURE — 97140 MANUAL THERAPY 1/> REGIONS: CPT | Performed by: PHYSICAL THERAPIST

## 2020-08-06 PROCEDURE — 97022 WHIRLPOOL THERAPY: CPT | Performed by: PHYSICAL THERAPIST

## 2020-08-06 NOTE — PROGRESS NOTES
PT Re-Evaluation     Today's date: 2020  Patient name: Rosalba Farnsworth  : 1970  MRN: [id number]  Referring provider: Nora Keith MD  Dx:   Encounter Diagnosis     ICD-10-CM    1  Closed fracture of posterior malleolus of left tibia with routine healing, subsequent encounter  S82 392D PT plan of care cert/re-cert       Start Time: 1200  Stop Time: 1308  Total time in clinic (min): 68 minutes    Assessment  Assessment details: Rosalba Farnsworth is a 54 y o  male who presents with pain, decreased strength, decreased ROM, joint effusion and ambulatory dysfunction  Due to these impairments, Patient has difficulty performing a/iadls, recreational activities, work-related activities and engaging in social activities  Patient's clinical presentation is consistent with their referring diagnosis of fracture malleolus  Patient is making slow steady progress in PT with improved ROM and slow increases in strength  Pain persists and is worse with activity  Edema is decreased Patient would benefit from continued skilled physical therapy to address their aforementioned impairments, improve their level of function and to improve their overall quality of life including a safe return to work  Impairments: abnormal gait, abnormal or restricted ROM, activity intolerance, impaired physical strength, lacks appropriate home exercise program and pain with function  Understanding of Dx/Px/POC: good   Prognosis: good    Goals  ST-4 WEEKS  1  Decrease pain by 5 points on VAS at its worst  - progressing towards  2  Increase ROM by > 5-10 deg in all deficients planes left ankle - progressing towards  3  Increase L ankle by 1/2 MMT grade in all deficient planes  MET    LT-8 WEEKS  1  Patient to be independent with a/iadls  2  Walk without assistive device all surfaces MET  3  Independent with HEP and/or fitness program     Goals: continue as above with LTG of safe return to work and previous level of function    1   ankle strength  2  Reciprocal  Stair ambulation without device  3  Increase FOTO by 10 points  Plan  Patient would benefit from: skilled physical therapy  Planned modality interventions: cryotherapy, thermotherapy: hydrocollator packs and hydrotherapy  Planned therapy interventions: activity modification, behavior modification, body mechanics training, aquatic therapy, functional ROM exercises, home exercise program, manual therapy, neuromuscular re-education, patient education, postural training, therapeutic activities, therapeutic exercise and gait training  Frequency: 2-3x week  Duration in weeks: 8  Plan of Care beginning date: 2020  Plan of Care expiration date: 10/7/2020  Treatment plan discussed with: patient        Subjective Evaluation    History of Present Illness  Date of onset: 2020  Mechanism of injury: Work related injury on 2020 where left foot was caught under elevator doors  Seen in PT for the past month for 9 visits to date  Patient continues to have high pain levels lateral ankle posterior ankle and lateral distal leg  Pain increases with activity and improves with rest  Reports concerns with continued pain  Patient will see MD next week for re-evaluation     Quality of life: good    Pain  Current pain ratin  At best pain ratin  At worst pain ratin  Location: lateral dorsal ankle and lateral mid leg; pain at Achilles insertion   Quality: sharp, dull ache and tight  Relieving factors: change in position, rest and ice  Aggravating factors: standing, walking and stair climbing  Progression: no change (pain)    Social Support  Lives in: multiple-level home  Lives with: spouse    Employment status: not working  Hand dominance: right  Exercise history: riding bike; treadmill running    Patient Goals  Patient goals for therapy: decreased pain, return to work, increased strength, increased motion and return to sport/leisure activities  Patient goal: steps step over step        Objective     Observations   Left Ankle/Foot   Positive for edema  Active Range of Motion   Left Ankle/Foot   Dorsiflexion (ke): 6 degrees   Plantar flexion: 28 degrees   Inversion: 20 degrees   Eversion: 10 degrees     Right Ankle/Foot   Dorsiflexion (ke): 10 degrees   Plantar flexion: 38 degrees   Inversion: 25 degrees   Eversion: 10 degrees     Passive Range of Motion   Left Ankle/Foot    Dorsiflexion (ke): 8 degrees   Plantar flexion: 25 degrees   Inversion: 33 degrees   Eversion: 12 degrees     Right Ankle/Foot  Normal passive range of motion    Strength/Myotome Testing     Left Ankle/Foot   Dorsiflexion: 3+  Plantar flexion: 3+  Inversion: 3+  Eversion: 3+    Right Ankle/Foot   Normal strength    Swelling   Left Ankle/Foot   Figure 8: 58 5 cm    Right Ankle/Foot   Figure 8: 58 cm    Ambulation     Ambulation: Level Surfaces   Ambulation with assistive device: independent  Ambulation without assistive device: independent    Ambulation: Stairs   Ascend stairs: independent  Pattern: non-reciprocal  Railings: one rail  Descend stairs: independent  Pattern: non-reciprocal  Railings: one rail    Observational Gait   Gait: antalgic   Increased walking speed         Flowsheet Rows      Most Recent Value   PT/OT G-Codes   Current Score  36   Projected Score  54          Precautions: standard      Manuals 7/9 7/15 7/17 7/22 7/24 7/28 7/31 8/3 8/6    Left ankle foot 10' 10' 10' 10 10 10 10 10 10                              Neuro Re-Ed                                                                 Ther Ex             Towel curls 10x HEP 10x 10x 10x 20x 20x 20x nt    Towel slides inv/ever  20x  20x 20x 20x 30x 30x 30x nt                 slant L2  30"/3 L2 30''x3 3x 4x 4x 30"x4 30"/4x  L2 30"/4x 4x    Disc fwrd/bwrd 10x 20x 30x 30 30 30x ea 30x 30x 30x    Disc CW/CCW 10x 20x 30x 30 30 30x ea 30x 30x 30x    isometrics  10''x10 MRE  10x 10x 10x  MRE isolator       Bike 5 6'  10 10        Ankle isolator 2#df/pf  1# in/ev 2#/30  1#/30 2/30  All planes 2#/30 each    SLS L  Blue mat   10"/  10x 10"/10x 10"/10  Blue mat 10"/10 10x 10"/  10x 10"  10x    Calf raises   10x 20x 30x 30 30x 30x 30x    Calf press        nv     Leg Press        nv 90#/  30x    Ther Activity             Step ups         15x  4" step                 Gait Training                                       Modalities             FWP 10 10' 10 10 10 10 10 10 10'    Ice post PRN             HEP: ankle isometrics, towel slides inv/ever, step calf stretch

## 2020-08-10 ENCOUNTER — OFFICE VISIT (OUTPATIENT)
Dept: PHYSICAL THERAPY | Age: 50
End: 2020-08-10
Payer: OTHER MISCELLANEOUS

## 2020-08-10 DIAGNOSIS — S82.392D CLOSED FRACTURE OF POSTERIOR MALLEOLUS OF LEFT TIBIA WITH ROUTINE HEALING, SUBSEQUENT ENCOUNTER: Primary | ICD-10-CM

## 2020-08-10 PROCEDURE — 97022 WHIRLPOOL THERAPY: CPT

## 2020-08-10 PROCEDURE — 97110 THERAPEUTIC EXERCISES: CPT

## 2020-08-10 NOTE — PROGRESS NOTES
Daily Note     Today's date: 8/10/2020  Patient name: Mars Garcia  : 1970  MRN: [id number]  Referring provider: Mandi Pena MD  Dx:   Encounter Diagnosis     ICD-10-CM    1  Closed fracture of posterior malleolus of left tibia with routine healing, subsequent encounter  S82 392D        Start Time: 1700  Stop Time: 9315  Total time in clinic (min): 70 minutes    Subjective: Reports continued pain at L lateral ankle, but does notice improvement  State it is better compared to last week  Objective: See treatment diary below      Assessment: Tolerated treatment well  Monitoring of reps necessary to ensure proper quantity of ex  Some discomfort with PROM that subsides at rest  Added leg press and calf press today with mild soreness post session  Patient would benefit from continued PT      Plan: Continue per plan of care        Precautions: standard      Manuals 7/9 7/15 7/17 7/22 7/24 7/28 7/31 8/3 8/10    Left ankle foot 10' 10' 10' 10 10 10 10 10 10                              Neuro Re-Ed                                                                 Ther Ex             Towel curls 10x HEP 10x 10x 10x 20x 20x 20x 20x    Towel slides inv/ever  20x  20x 20x 20x 30x 30x 30x 30x                 AROM all planes 10x 20x 20x 20x 20x  disc      slant L2  30"/3 L2 30''x3 3x 4x 4x 30"x4 30"/4x  L2 30"/4x 30''x4    Disc fwrd/bwrd 10x 20x 30x 30 30 30x ea 30x 30x 30x    Disc CW/CCW 10x 20x 30x 30 30 30x ea 30x 30x 30x    isometrics  10''x10 MRE  10x 10x 10x  MRE isolator       Bike 5 6'  10 10    10'    Ankle isolator      2#df/pf  1# in/ev 2#/30  1#/30 2/30  All planes 2# 30x     SLS L  Blue mat   10"/  10x 10"/10x 10"/10  Blue mat 10"/10 10x 10"/  10x 10''x10    Calf raises   10x 20x 30x 30 30x 30x 30x    Calf press: seat 17        nv 70/30    Leg Press: seat 4, pad 8        nv 120/30    Ther Activity                                       Gait Training                                       Modalities FWP 10 10' 10 10 10 10 10 10 10    Ice post PRN             HEP: ankle isometrics, towel slides inv/ever, step calf stretch

## 2020-08-11 ENCOUNTER — OFFICE VISIT (OUTPATIENT)
Dept: OBGYN CLINIC | Facility: CLINIC | Age: 50
End: 2020-08-11

## 2020-08-11 VITALS
BODY MASS INDEX: 29.22 KG/M2 | DIASTOLIC BLOOD PRESSURE: 76 MMHG | WEIGHT: 235 LBS | SYSTOLIC BLOOD PRESSURE: 140 MMHG | HEIGHT: 75 IN

## 2020-08-11 DIAGNOSIS — S82.392D CLOSED FRACTURE OF POSTERIOR MALLEOLUS OF LEFT TIBIA WITH ROUTINE HEALING, SUBSEQUENT ENCOUNTER: Primary | ICD-10-CM

## 2020-08-11 DIAGNOSIS — S99.912D INJURY OF LEFT ANKLE, SUBSEQUENT ENCOUNTER: ICD-10-CM

## 2020-08-11 PROCEDURE — 99024 POSTOP FOLLOW-UP VISIT: CPT | Performed by: ORTHOPAEDIC SURGERY

## 2020-08-11 NOTE — PROGRESS NOTES
Assessment:     1  Closed fracture of posterior malleolus of left tibia with routine healing, subsequent encounter    2  Injury of left ankle, subsequent encounter          Plan:     Problem List Items Addressed This Visit        Musculoskeletal and Integument    Closed fracture of posterior malleolus of left tibia - Primary    Relevant Orders    Ambulatory referral to Physical Therapy      Other Visit Diagnoses     Injury of left ankle, subsequent encounter                54 y o  male with a left lower leg injury due to a work injury  Overall He has made improvement with PT  It was discussed today that he injured the soft tissues surrounding his ankle  He needs to continue to push through the discomfort with regards to his therapy exercises  A new PT script was provided today for aggressiveness stretching, strengthening and ROM exercises  He did have a near normal gate when walking out of the office  His work restrictions have been increased to no more then 10 minutes standing per hour and no lifting, pushing or pulling greater then 15 lbs  I will see him back in 6 weeks time  Patient ID: Naldo Rodarte is a 54 y o  male  Chief Complaint:  Left ankle fracture follow up     HPI:  54 y o  male presents to the office today for a follow up regarding a left lower extremity work related injury, DOI 5/14/2020  Alvina has been attending PT  He notes improvement with regards to his ankle motion  He notes continued pain to the lateral, anterior, posterior and bottom of his foot  He notes that he also does not feel like his strength is improving he has d c the use of the cane  He is not taking anything for pain cotnrol at this time         Allergy:  No Known Allergies    Medications:  all current active meds have been reviewed    Past Medical History:  Past Medical History:   Diagnosis Date    Asthma        Past Surgical History:  Past Surgical History:   Procedure Laterality Date    COLONOSCOPY         Family History:  Family History   Problem Relation Age of Onset    Diabetes Mother     Cancer Father        Social History:  Social History     Substance and Sexual Activity   Alcohol Use Yes    Comment: socially     Social History     Substance and Sexual Activity   Drug Use No     Social History     Tobacco Use   Smoking Status Never Smoker   Smokeless Tobacco Never Used           ROS:  Review of Systems   Constitutional: Negative for chills, fever and unexpected weight change  HENT: Negative for hearing loss, nosebleeds and sore throat  Eyes: Negative for pain, redness and visual disturbance  Respiratory: Negative for cough, shortness of breath and wheezing  Cardiovascular: Negative for chest pain, palpitations and leg swelling  Gastrointestinal: Negative for abdominal pain, nausea and vomiting  Endocrine: Negative for polydipsia and polyuria  Genitourinary: Negative for difficulty urinating and hematuria  Musculoskeletal: Negative for arthralgias, joint swelling and myalgias  Skin: Negative for rash and wound  Neurological: Negative for dizziness, numbness and headaches  Psychiatric/Behavioral: Negative for decreased concentration, dysphoric mood and suicidal ideas  The patient is not nervous/anxious  Objective:  BP Readings from Last 1 Encounters:   08/11/20 140/76      Wt Readings from Last 1 Encounters:   08/11/20 107 kg (235 lb)        BMI:   Estimated body mass index is 29 37 kg/m² as calculated from the following:    Height as of this encounter: 6' 3" (1 905 m)  Weight as of this encounter: 107 kg (235 lb)  EXAM:   Physical Exam  Vitals signs and nursing note reviewed  Constitutional:       Appearance: He is well-developed  Eyes:      Conjunctiva/sclera: Conjunctivae normal       Pupils: Pupils are equal, round, and reactive to light  Pulmonary:      Effort: Pulmonary effort is normal       Breath sounds: Normal breath sounds  Skin:     General: Skin is warm and dry  Neurological:      Mental Status: He is alert and oriented to person, place, and time  Psychiatric:         Behavior: Behavior normal        Left Ankle Exam     Tenderness   The patient is experiencing tenderness in the ATF       Range of Motion   Left ankle dorsiflexion: 60    Plantar flexion: 30     Other   Erythema: absent  Scars: absent  Sensation: normal  Pulse: present    Comments:  Anterior capsule tender to palpation   Achilles tender to palpation   Plantar fascia tender to palpation, more medial then lateral  4-/5 inversion and eversion strength   4/5 dorsi and plantarflexion strength   Antalgic gate   Unable to heel toe walk   Near normal gate noted when walking out of office              Radiographs:  No new imaging to review       Scribe Attestation    I,:   Carin Gates am acting as a scribe while in the presence of the attending physician :        I,:   Ced Huff MD personally performed the services described in this documentation    as scribed in my presence :

## 2020-08-11 NOTE — LETTER
August 11, 2020     Patient: Tracy Hager   YOB: 1970   Date of Visit: 8/11/2020       To Whom it May Concern:    Tracy Hager is under my professional care  He was seen in my office on 8/11/2020  He may return to work with limitations that include light duty only  No standing or walking for more than 10 min per hour  No lifting, pushing, pulling or grasping greater then 10 lbs  He will be re-evaluated in 6 weeks time       If you have any questions or concerns, please don't hesitate to call           Sincerely,          Severiano Overland, MD

## 2020-08-12 ENCOUNTER — TELEPHONE (OUTPATIENT)
Dept: OBGYN CLINIC | Facility: HOSPITAL | Age: 50
End: 2020-08-12

## 2020-08-12 NOTE — TELEPHONE ENCOUNTER
Patient has turned in disability paperwork that is asking for a temporary impairment %?     Please advise    Thank you

## 2020-08-13 ENCOUNTER — APPOINTMENT (OUTPATIENT)
Dept: PHYSICAL THERAPY | Age: 50
End: 2020-08-13
Payer: OTHER MISCELLANEOUS

## 2020-08-13 NOTE — PROGRESS NOTES
Daily Note     Today's date: 2020  Patient name: Teodora Rodriguez  : 1970  MRN: [id number]  Referring provider: Afshan Jones MD  Dx:   Encounter Diagnosis     ICD-10-CM    1  Closed fracture of posterior malleolus of left tibia with routine healing, subsequent encounter  S92 038E                   Subjective: ***      Objective: See treatment diary below      Assessment: Tolerated treatment {Tolerated treatment :4519600402}   Patient {assessment:7554039477}      Plan: {PLAN:1067013612}     Precautions: standard      Manuals 7/9 7/15 7/17 7/22 7/24 7/28 7/31 8/3 8/10    Left ankle foot 10' 10' 10' 10 10 10 10 10 10                              Neuro Re-Ed                                                                 Ther Ex             Towel curls 10x HEP 10x 10x 10x 20x 20x 20x 20x    Towel slides inv/ever  20x  20x 20x 20x 30x 30x 30x 30x                 AROM all planes 10x 20x 20x 20x 20x  disc      slant L2  30"/3 L2 30''x3 3x 4x 4x 30"x4 30"/4x  L2 30"/4x 30''x4    Disc fwrd/bwrd 10x 20x 30x 30 30 30x ea 30x 30x 30x    Disc CW/CCW 10x 20x 30x 30 30 30x ea 30x 30x 30x    isometrics  10''x10 MRE  10x 10x 10x  MRE isolator       Bike 5 6'  10 10    10'    Ankle isolator      2#df/pf  1# in/ev 2#/30  1#/30 2/30  All planes 2# 30x     SLS L  Blue mat   10"/  10x 10"/10x 10"/10  Blue mat 10"/10 10x 10"/  10x 10''x10    Calf raises   10x 20x 30x 30 30x 30x 30x    Calf press: seat 17        nv 70/30    Leg Press: seat 4, pad 8        nv 120/30    Ther Activity                                       Gait Training                                       Modalities             FWP 10 10' 10 10 10 10 10 10 10    Ice post PRN             HEP: ankle isometrics, towel slides inv/ever, step calf stretch

## 2020-08-17 ENCOUNTER — OFFICE VISIT (OUTPATIENT)
Dept: PHYSICAL THERAPY | Age: 50
End: 2020-08-17
Payer: OTHER MISCELLANEOUS

## 2020-08-17 DIAGNOSIS — S82.392D CLOSED FRACTURE OF POSTERIOR MALLEOLUS OF LEFT TIBIA WITH ROUTINE HEALING, SUBSEQUENT ENCOUNTER: Primary | ICD-10-CM

## 2020-08-17 PROCEDURE — 97022 WHIRLPOOL THERAPY: CPT

## 2020-08-17 PROCEDURE — 97110 THERAPEUTIC EXERCISES: CPT

## 2020-08-17 NOTE — PROGRESS NOTES
Daily Note     Today's date: 2020  Patient name: Aracely Davis  : 1970  MRN: [id number]  Referring provider: Carolann Phillips MD  Dx:   Encounter Diagnosis     ICD-10-CM    1  Closed fracture of posterior malleolus of left tibia with routine healing, subsequent encounter  S82 392D        Start Time: 1310  Stop Time: 1410  Total time in clinic (min): 60 minutes    Subjective: Reports no changes in pain  States he has been exercising his ankle at home a lot trying to strengthen it, but continues with pain  Thinks he overdid it a little because his calf was sore  Objective: See treatment diary below  Patient arrived x10 min late for PT     Assessment: Tolerated treatment well  Challenged by SLS plyo ball toss, occasionally needs to place R foot down to stabilize between tosses  C/o discomfort with PROM, especially DF/PF  Progressed time for SLS on foam today, mild discomfort noted  Patient would benefit from continued PT      Plan: Continue per plan of care        Precautions: standard      Manuals 7/9 7/15 7/17 7/22 7/24 7/28 7/31 8/3 8/10 8/17   Left ankle foot 10' 10' 10' 10 10 10 10 10 10 10'                             Neuro Re-Ed                                                                 Ther Ex             Towel curls 10x HEP 10x 10x 10x 20x 20x 20x 20x At home   Towel slides inv/ever  20x  20x 20x 20x 30x 30x 30x 30x 30x                AROM all planes 10x 20x 20x 20x 20x  disc      slant L2  30"/3 L2 30''x3 3x 4x 4x 30"x4 30"/4x  L2 30"/4x 30''x4 30''x4   Disc fwrd/bwrd 10x 20x 30x 30 30 30x ea 30x 30x 30x standing 30x   Disc CW/CCW 10x 20x 30x 30 30 30x ea 30x 30x 30x standing 30x   isometrics  10''x10 MRE  10x 10x 10x  MRE isolator       Bike 5 6'  10 10    10' 10'   Ankle isolator      2#df/pf  1# in/ev 2#/30  1#/30 2/30  All planes 2# 30x  2# 30x ea   SLS L  Blue mat   10"/  10x 10"/10x 10"/10  Blue mat 10"/10 10x 10"/  10x 10''x10 20''x5   SLS plyoback           Red 30x   Calf raises 10x 20x 30x 30 30x 30x 30x 30x   Calf press: seat 17        nv 70/30 70/30   Leg Press: seat 4, pad 8        nv 120/30 120/30   Ther Activity                                       Gait Training                                       Modalities             FWP 10 10' 10 10 10 10 10 10 10 10'   Ice post PRN             HEP: ankle isometrics, towel slides inv/ever, step calf stretch

## 2020-08-20 ENCOUNTER — TELEPHONE (OUTPATIENT)
Dept: OBGYN CLINIC | Facility: HOSPITAL | Age: 50
End: 2020-08-20

## 2020-08-20 ENCOUNTER — OFFICE VISIT (OUTPATIENT)
Dept: PHYSICAL THERAPY | Age: 50
End: 2020-08-20
Payer: OTHER MISCELLANEOUS

## 2020-08-20 DIAGNOSIS — S82.392D CLOSED FRACTURE OF POSTERIOR MALLEOLUS OF LEFT TIBIA WITH ROUTINE HEALING, SUBSEQUENT ENCOUNTER: Primary | ICD-10-CM

## 2020-08-20 PROCEDURE — 97110 THERAPEUTIC EXERCISES: CPT

## 2020-08-20 PROCEDURE — 97112 NEUROMUSCULAR REEDUCATION: CPT

## 2020-08-20 NOTE — PROGRESS NOTES
Daily Note     Today's date: 2020  Patient name: Onur Mckeon  : 1970  MRN: [id number]  Referring provider: Lara Winn MD  Dx:   Encounter Diagnosis     ICD-10-CM    1  Closed fracture of posterior malleolus of left tibia with routine healing, subsequent encounter  S82 392D        Start Time: 96  Stop Time: 0761  Total time in clinic (min): 60 minutes    Subjective: Patient reports 5/10 left foot pain today  Notes getting better, but pain increases with increased activities  Objective: See treatment diary below      Assessment: Tolerated treatment fairly well, pain and tenderness lateral malleolus, guarded during manual therapy surya with MRE's to EV/IV  Patient tends to favor the left le during balance activities and with leg/calf press  Added sl leg press with good tolerance  ROM and strength slowly improving  Patient demonstrated fatigue post treatment and would benefit from continued PT  Active Range of Motion   Left Ankle/Foot   Dorsiflexion (ke): 6 degrees   Plantar flexion: 28 degrees   Inversion: 20 degrees   Eversion: 10 degrees           Passive Range of Motion   Left Ankle/Foot    Dorsiflexion (ke): 8 degrees   Plantar flexion: 25 degrees   Inversion: 33 degrees   Eversion: 12 degrees     Plan: Continue per plan of care        Precautions: standard      Manuals 8/20  7/17 7/22 7/24 7/28 7/31 8/3 8/10 8/17   Left ankle foot 10' 10' 10' 10 10 10 10 10 10 10'                             Neuro Re-Ed                                                                 Ther Ex             Towel curls  HEP 10x 10x 10x 20x 20x 20x 20x At home   Towel slides inv/ever 30x 20x  20x 20x 20x 30x 30x 30x 30x 30x                AROM all planes  20x 20x 20x 20x  disc      slant L2  30"/4 L2 30''x3 3x 4x 4x 30"x4 30"/4x  L2 30"/4x 30''x4 30''x4   Disc fwrd/bwrd 30x 20x 30x 30 30 30x ea 30x 30x 30x standing 30x   Disc CW/CCW 30x 20x 30x 30 30 30x ea 30x 30x 30x standing 30x   isometrics  10''x10 MRE  10x 10x 10x  MRE isolator       Bike 10' 6'  10 10    10' 10'   Ankle isolator 2#/30     2#df/pf  1# in/ev 2#/30  1#/30 2/30  All planes 2# 30x  2# 30x ea   tandem 3x            SLS L  Blue mat 20"x5  10"/  10x 10"/10x 10"/10  Blue mat 10"/10 10x 10"/  10x 10''x10 20''x5   SLS plyoback  30x         Red 30x   Calf raises 30x  10x 20x 30x 30 30x 30x 30x 30x   Calf press: seat 17 75/30       nv 70/30 70/30   Leg Press: seat 4, pad 8 130/30       nv 120/30 120/30   SL press 50/30                                      Gait Training                                       Modalities             FWP nt 10' 10 10 10 10 10 10 10 10'   Ice post PRN             HEP: ankle isometrics, towel slides inv/ever, step calf stretch

## 2020-08-20 NOTE — TELEPHONE ENCOUNTER
Patient is calling to ask us to send him his OVN from 08/11/2020  He asked if we can send it to him in [address]  If that's not possible please let him know and he will come into the office to  a copy

## 2020-08-24 NOTE — PROGRESS NOTES
Daily Note     Today's date: 2020  Patient name: Denny Fontaine  : 1970  MRN: [id number]  Referring provider: Hannah Ramirez MD  Dx:   Encounter Diagnosis     ICD-10-CM    1  Closed fracture of posterior malleolus of left tibia with routine healing, subsequent encounter  Q92 403D                   Subjective: ***      Objective: See treatment diary below      Assessment: Tolerated treatment {Tolerated treatment :4326019904}   Patient {assessment:0737383608}      Plan: {PLAN:4909523015}     Precautions: standard      Manuals 8/20  7/17 7/22 7/24 7/28 7/31 8/3 8/10 8/17   Left ankle foot 10' 10' 10' 10 10 10 10 10 10 10'                             Neuro Re-Ed                                                                 Ther Ex             Towel curls  HEP 10x 10x 10x 20x 20x 20x 20x At home   Towel slides inv/ever 30x 20x  20x 20x 20x 30x 30x 30x 30x 30x                AROM all planes  20x 20x 20x 20x  disc      slant L2  30"/4 L2 30''x3 3x 4x 4x 30"x4 30"/4x  L2 30"/4x 30''x4 30''x4   Disc fwrd/bwrd 30x 20x 30x 30 30 30x ea 30x 30x 30x standing 30x   Disc CW/CCW 30x 20x 30x 30 30 30x ea 30x 30x 30x standing 30x   isometrics  10''x10 MRE  10x 10x 10x  MRE isolator       Bike 10' 6'  10 10    10' 10'   Ankle isolator 2#/30     2#df/pf  1# in/ev 2#/30  1#/30 2/30  All planes 2# 30x  2# 30x ea   tandem 3x            SLS L  Blue mat 20"x5  10"/  10x 10"/10x 10"/10  Blue mat 10"/10 10x 10"/  10x 10''x10 20''x5   SLS plyoback  30x         Red 30x   Calf raises 30x  10x 20x 30x 30 30x 30x 30x 30x   Calf press: seat 17 75/30       nv 70/30 70/30   Leg Press: seat 4, pad 8 130/30       nv 120/30 120/30   SL press 50/30                                      Gait Training                                       Modalities             FWP nt 10' 10 10 10 10 10 10 10 10'   Ice post PRN             HEP: ankle isometrics, towel slides inv/ever, step calf stretch

## 2020-08-26 ENCOUNTER — APPOINTMENT (OUTPATIENT)
Dept: PHYSICAL THERAPY | Age: 50
End: 2020-08-26
Payer: OTHER MISCELLANEOUS

## 2020-09-22 ENCOUNTER — OFFICE VISIT (OUTPATIENT)
Dept: OBGYN CLINIC | Facility: CLINIC | Age: 50
End: 2020-09-22
Payer: OTHER MISCELLANEOUS

## 2020-09-22 VITALS — HEIGHT: 75 IN | BODY MASS INDEX: 29.22 KG/M2 | WEIGHT: 235 LBS

## 2020-09-22 DIAGNOSIS — S82.392D CLOSED FRACTURE OF POSTERIOR MALLEOLUS OF LEFT TIBIA WITH ROUTINE HEALING, SUBSEQUENT ENCOUNTER: Primary | ICD-10-CM

## 2020-09-22 PROCEDURE — 99213 OFFICE O/P EST LOW 20 MIN: CPT | Performed by: ORTHOPAEDIC SURGERY

## 2020-09-22 NOTE — ASSESSMENT & PLAN NOTE
29-year-old male with a healed posterior malleolus fracture with progressive improvement of his soft tissue strength and motion  I recommended he continue working with physical therapy and working on aggressive strengthening  A work note was given today restricting is standing and walking to 30 minutes/hour and no lifting pushing or pulling more than 25 lb  He will follow up in six weeks  No x-rays will be needed

## 2020-09-22 NOTE — LETTER
September 22, 2020     Patient: Rosalba Farnsworth   YOB: 1970   Date of Visit: 9/22/2020       To Whom it May Concern:    Rosalba Farnsworth is under my professional care  He was seen in my office on 9/22/2020  He may return to work with limitations that is no standing or walking more than 30 min per hour  No lifting, pushing, pulling, or grasping more than 25 lbs  He is to work PT- he has no restrictions when working with PT  Follow up in 6 weeks  If you have any questions or concerns, please don't hesitate to call           Sincerely,          Tabatha Bhandari MD        CC: No Recipients

## 2020-09-22 NOTE — PROGRESS NOTES
Assessment:     1  Closed fracture of posterior malleolus of left tibia with routine healing, subsequent encounter          Plan:     Problem List Items Addressed This Visit        Musculoskeletal and Integument    Closed fracture of posterior malleolus of left tibia - Primary     77-year-old male with a healed posterior malleolus fracture with progressive improvement of his soft tissue strength and motion  I recommended he continue working with physical therapy and working on aggressive strengthening  A work note was given today restricting is standing and walking to 30 minutes/hour and no lifting pushing or pulling more than 25 lb  He will follow up in six weeks  No x-rays will be needed  Relevant Orders    Ambulatory referral to Physical Therapy            Patient ID: Sukhjinder Stover is a 54 y o  male  Chief Complaint:  Left ankle fracture follow up     HPI:  54 y o  male presents to the office today for a follow up regarding a left lower extremity work related injury, DOI 5/14/2020  He has not been attending physical therapy for the last month because he states that has not had approval   He does note that he is feeling better  He is having pain with certain motions and has weakness          Allergy:  No Known Allergies    Medications:  all current active meds have been reviewed    Past Medical History:  Past Medical History:   Diagnosis Date    Asthma        Past Surgical History:  Past Surgical History:   Procedure Laterality Date    COLONOSCOPY         Family History:  Family History   Problem Relation Age of Onset    Diabetes Mother     Cancer Father        Social History:  Social History     Substance and Sexual Activity   Alcohol Use Yes    Comment: socially     Social History     Substance and Sexual Activity   Drug Use No     Social History     Tobacco Use   Smoking Status Never Smoker   Smokeless Tobacco Never Used           ROS:  Review of Systems   All other systems reviewed and are negative  Objective:  BP Readings from Last 1 Encounters:   08/11/20 140/76      Wt Readings from Last 1 Encounters:   09/22/20 107 kg (235 lb)        BMI:   Estimated body mass index is 29 37 kg/m² as calculated from the following:    Height as of this encounter: 6' 3" (1 905 m)  Weight as of this encounter: 107 kg (235 lb)  EXAM:   Physical Exam  Left Ankle Exam     Tenderness   Left ankle tenderness location: Posterior lateral tenderness     Swelling: none    Range of Motion   Left ankle dorsiflexion: 60    Plantar flexion: 40     Muscle Strength   Dorsiflexion:  5/5   Plantar flexion:  4/5   Anterior tibial:  5/5   Posterior tibial:  5/5  Gastrocsoleus:  4/5  Peroneal muscle:  5/5    Tests   Anterior drawer: negative  Varus tilt: negative    Other   Erythema: absent  Scars: absent  Sensation: normal  Pulse: present    Comments:  Difficulty doing a single toe rise, difficulty walking on his toes

## 2020-10-22 NOTE — PROGRESS NOTES
Patient last seen in PT on 8/20/20  Additional PT was ordered however was not approved for additional visits by insurance  Patient was given the C4 form on 8/6/20 for his MD visit on 8/11/20  He was telephoned on 8/21/20 by office staff to call MD office to determine if C4 form was completed  No additonal PT scheduled  Discharge PT with goals partially met

## 2020-11-02 ENCOUNTER — OFFICE VISIT (OUTPATIENT)
Dept: OBGYN CLINIC | Facility: CLINIC | Age: 50
End: 2020-11-02
Payer: OTHER MISCELLANEOUS

## 2020-11-02 VITALS
BODY MASS INDEX: 29.22 KG/M2 | HEIGHT: 75 IN | HEART RATE: 62 BPM | SYSTOLIC BLOOD PRESSURE: 148 MMHG | TEMPERATURE: 98.4 F | DIASTOLIC BLOOD PRESSURE: 88 MMHG | WEIGHT: 235 LBS

## 2020-11-02 DIAGNOSIS — S87.82XD CRUSHING INJURY OF LEFT LOWER LEG, SUBSEQUENT ENCOUNTER: ICD-10-CM

## 2020-11-02 DIAGNOSIS — S82.392D CLOSED FRACTURE OF POSTERIOR MALLEOLUS OF LEFT TIBIA WITH ROUTINE HEALING, SUBSEQUENT ENCOUNTER: Primary | ICD-10-CM

## 2020-11-02 PROCEDURE — 99213 OFFICE O/P EST LOW 20 MIN: CPT | Performed by: ORTHOPAEDIC SURGERY

## 2020-12-01 ENCOUNTER — TELEPHONE (OUTPATIENT)
Dept: OBGYN CLINIC | Facility: HOSPITAL | Age: 50
End: 2020-12-01

## 2020-12-01 NOTE — TELEPHONE ENCOUNTER
Patient is asking about a C4 auth form for [facility] for his PT  His insurance company said it was never submitted       [employer] insurance, patient didn't have the fax #     CB # [phone]

## 2020-12-29 ENCOUNTER — OFFICE VISIT (OUTPATIENT)
Dept: OBGYN CLINIC | Facility: CLINIC | Age: 50
End: 2020-12-29
Payer: OTHER MISCELLANEOUS

## 2020-12-29 VITALS — WEIGHT: 235 LBS | HEIGHT: 75 IN | BODY MASS INDEX: 29.22 KG/M2

## 2020-12-29 DIAGNOSIS — T14.8XXA CRUSH INJURY: ICD-10-CM

## 2020-12-29 DIAGNOSIS — S82.392D CLOSED FRACTURE OF POSTERIOR MALLEOLUS OF LEFT TIBIA WITH ROUTINE HEALING, SUBSEQUENT ENCOUNTER: Primary | ICD-10-CM

## 2020-12-29 PROCEDURE — 99213 OFFICE O/P EST LOW 20 MIN: CPT | Performed by: ORTHOPAEDIC SURGERY

## 2020-12-30 ENCOUNTER — TELEPHONE (OUTPATIENT)
Dept: OBGYN CLINIC | Facility: HOSPITAL | Age: 50
End: 2020-12-30

## 2021-01-25 ENCOUNTER — EVALUATION (OUTPATIENT)
Dept: PHYSICAL THERAPY | Age: 51
End: 2021-01-25
Payer: OTHER MISCELLANEOUS

## 2021-01-25 DIAGNOSIS — M25.572 CHRONIC PAIN OF LEFT ANKLE: Primary | ICD-10-CM

## 2021-01-25 DIAGNOSIS — G89.29 CHRONIC PAIN OF LEFT ANKLE: Primary | ICD-10-CM

## 2021-01-25 DIAGNOSIS — S82.392D CLOSED FRACTURE OF POSTERIOR MALLEOLUS OF LEFT TIBIA WITH ROUTINE HEALING, SUBSEQUENT ENCOUNTER: ICD-10-CM

## 2021-01-25 DIAGNOSIS — T14.8XXA CRUSH INJURY: ICD-10-CM

## 2021-01-25 PROCEDURE — 97162 PT EVAL MOD COMPLEX 30 MIN: CPT | Performed by: PHYSICAL THERAPIST

## 2021-01-25 PROCEDURE — 97110 THERAPEUTIC EXERCISES: CPT | Performed by: PHYSICAL THERAPIST

## 2021-01-25 PROCEDURE — 97140 MANUAL THERAPY 1/> REGIONS: CPT | Performed by: PHYSICAL THERAPIST

## 2021-01-25 NOTE — PROGRESS NOTES
PT Evaluation     Today's date: 2021  Patient name: Adriel Multani  : 1970  MRN: [id number]  Referring provider: Ryan Gil MD  Dx:   Encounter Diagnosis     ICD-10-CM    1  Closed fracture of posterior malleolus of left tibia with routine healing, subsequent encounter  S82 392D Ambulatory referral to Physical Therapy   2  Crush injury  T14  8XXA Ambulatory referral to Physical Therapy       Start Time:   Stop Time:   Total time in clinic (min): 60 minutes    Assessment  Assessment details: Adriel Multani is a 52 y o  male who presents with pain, decreased strength, decreased ROM and ambulatory dysfunction  Due to these impairments, Patient has difficulty performing a/iadls and work-related activities  Patient's clinical presentation is consistent with their referring diagnosis of left ankle pain, due to s/p posterior malleolus Fx  Patient is guarded with PROM left ankle and tender to palpate lateral calf, around subtalar joint anterior and posterior  Unable to weight bear on LLE due to pain  Patient would benefit from skilled physical therapy to address their aforementioned impairments, improve their level of function and to improve their overall quality of life  Impairments: abnormal gait, abnormal or restricted ROM, activity intolerance, impaired physical strength, lacks appropriate home exercise program, pain with function and weight-bearing intolerance  Understanding of Dx/Px/POC: good   Prognosis: good    Goals  ST-4 WEEKS  1  Decrease left ankle and leg pain < 6/10 on VAS at its worst   2   Increase ROM by > 5-10 deg in all deficients planes left ankle  3   Increase Left ankle strength by 1/2 MMT grade in all deficient planes  LT-8 WEEKS  1  Patient to be independent with a/iadls  2  Independent with HEP and/or fitness program   3  Able to return to prior level of function  Plan  Plan details: Patient received approval for 4 weeks of PT     Patient would benefit from: skilled physical therapy  Planned modality interventions: cryotherapy  Planned therapy interventions: activity modification, body mechanics training, aquatic therapy, functional ROM exercises, home exercise program, manual therapy, neuromuscular re-education, patient education, postural training, therapeutic activities, therapeutic exercise, gait training, joint mobilization, balance/weight bearing training, behavior modification, strengthening and stretching  Frequency: 2-3x week  Duration in weeks: 8  Plan of Care beginning date: 2021  Plan of Care expiration date: 2021  Treatment plan discussed with: patient        Subjective Evaluation    History of Present Illness  Date of onset: 2020  Mechanism of injury: trauma  Mechanism of injury: Work related injury on 2020 where left foot was caught under elevator doors  Patient fractured posterior left malleolus and put in camboot  He progressed out of boot and started PT in 2020 for 6 weeks  His authorization ran out and was waiting for more authorization from work comp despite MD orders to continue PT  He is now able to resume PT for 4 weeks only  He c/o pain lateral left calf, anterior left ankle and dorsal foot  Patient is anxious to RTW but unable until he is cleared 100%             Recurrent probem    Pain  Current pain ratin  At best pain ratin  At worst pain rating: 10  Location: lateral mid leg, lateral ankle anterior ankle and arch of left foot  Quality: sharp and dull ache  Relieving factors: change in position, rest and ice  Aggravating factors: standing, walking and stair climbing (sleeping)  Progression: worsening    Social Support  Steps to enter house: yes  Stairs in house: no   Lives in: [place] house  Lives with: spouse    Employment status: not working  Hand dominance: right  Exercise history: riding bike; treadmill running      Diagnostic Tests  X-ray: normal    FCE comments: Fracture is healed on xrayPatient Goals  Patient goals for therapy: decreased pain, return to work, increased strength and increased motion  Patient goal: walk or stand > 30 min  Objective     Observations     Additional Observation Details  No atrophy noted in LLE, no swelling noted in LLE    Palpation   Left   Tenderness of the anterior tibialis, lateral gastrocnemius and peroneus  Tenderness   Left Ankle/Foot   Tenderness in the anterior talofibular ligament, posterior talofibular ligament and talar dome  Active Range of Motion   Left Knee   Normal active range of motion  Left Ankle/Foot   Dorsiflexion (ke): -5 degrees   Plantar flexion: 20 degrees   Inversion: 18 degrees   Eversion: 18 degrees     Right Ankle/Foot   Normal active range of motion    Additional Active Range of Motion Details  Patient guarded with Active and passive ROM on command left ankle stating pain  Passive Range of Motion   Left Ankle/Foot    Dorsiflexion (ke): 5 degrees   Plantar flexion: 25 degrees with pain  Inversion: 33 degrees   Eversion: 20 degrees     Right Ankle/Foot  Normal passive range of motion    Strength/Myotome Testing     Left Knee   Flexion: 4+  Extension: 4+    Left Ankle/Foot   Dorsiflexion: 3+  Plantar flexion: 3+  Inversion: 3+  Eversion: 3+    Right Ankle/Foot   Normal strength    Additional Strength Details  In neutral position static testing left ankle  Ambulation   Weight-Bearing Status   Weight-Bearing Status (Left): weight-bearing as tolerated   Assistive device used: none    Ambulation: Level Surfaces   Ambulation without assistive device: independent    Ambulation: Stairs   Ascend stairs: independent  Pattern: non-reciprocal  Railings: one rail  Descend stairs: independent  Pattern: non-reciprocal  Railings: one rail    Observational Gait   Gait: antalgic   Decreased left stance time       Functional Assessment        Single Leg Stance   Left: 3 seconds  Right: 8 seconds    General Comments:    Lower quarter screen   Hips: unremarkable      Flowsheet Rows      Most Recent Value   PT/OT G-Codes   Current Score  29 [left ankle]   Projected Score  52   Assessment Type  Evaluation             Precautions:standard      Manuals 1/25            Left ankle foot 5            STM left calf 10                         Neuro Re-Ed                                                                 Ther Ex             Towel curls             Towel slides inv/ever             Hip abd 50# 30x            Hip add 50# 30x            slant L2  30"/4            Disc fwrd/bwrd nt            Disc CW/CCW nt                         Bike home            Ankle isolator nt            Neuro Re-Ed  Side/side 3x            Tandem For/back 3x            SLS L  Blue mat home            SLS plyoback  nt            pods 3x            Calf press: seat 17 nt            Leg Press: seat 4, pad 8 120# /30x            SL press 70# /30x            Heel raises nt                         Gait Training                                       Modalities                          Ice post PRN             HEP: SLS LLE, TB inv/ever, step calf stretch

## 2021-01-27 ENCOUNTER — OFFICE VISIT (OUTPATIENT)
Dept: PHYSICAL THERAPY | Age: 51
End: 2021-01-27
Payer: OTHER MISCELLANEOUS

## 2021-01-27 DIAGNOSIS — S82.392D CLOSED FRACTURE OF POSTERIOR MALLEOLUS OF LEFT TIBIA WITH ROUTINE HEALING, SUBSEQUENT ENCOUNTER: Primary | ICD-10-CM

## 2021-01-27 DIAGNOSIS — G89.29 CHRONIC PAIN OF LEFT ANKLE: ICD-10-CM

## 2021-01-27 DIAGNOSIS — T14.8XXA CRUSH INJURY: ICD-10-CM

## 2021-01-27 DIAGNOSIS — M25.572 CHRONIC PAIN OF LEFT ANKLE: ICD-10-CM

## 2021-01-27 PROCEDURE — 97112 NEUROMUSCULAR REEDUCATION: CPT

## 2021-01-27 PROCEDURE — 97110 THERAPEUTIC EXERCISES: CPT

## 2021-01-27 NOTE — PROGRESS NOTES
Daily Note     Today's date: 2021  Patient name: Zabrina Pacheco  : 1970  MRN: [id number]  Referring provider: Lee Jay MD  Dx:   Encounter Diagnosis     ICD-10-CM    1  Closed fracture of posterior malleolus of left tibia with routine healing, subsequent encounter  S82 392D    2  Crush injury  T14  8XXA    3  Chronic pain of left ankle  M25 572     G89 29        Start Time: 1645  Stop Time: 1730  Total time in clinic (min): 45 minutes    Subjective: Patient reports sore today 6-7/10 left foot/ankle pain today  Objective: See treatment diary below      Assessment: Tolerated treatment well  Patient demonstrated fatigue post treatment  Leslie with balance on the left LE  Added K tape to the left ankle/stir up technique  Tenderness left calf and foot  Patient motivated to get better, no additional c/o pain with exercises  Plan: Continue per plan of care        Precautions:standard      Manuals            Left ankle foot 5 5           STM left calf 10 10                        Neuro Re-Ed             Ktape   10'                                                  Ther Ex             Towel curls             Towel slides inv/ever             Hip abd 50# 30x 50#/30           Hip add 50# 30x 50#/30           slant L2  30"/4 30"x4           Disc fwrd/bwrd nt 20x ea           Disc CW/CCW nt 20x ea                        Bike home            Ankle isolator nt 1 5#/20x ea           Neuro Re-Ed  Side/side 3x 3x           Tandem For/back 3x 3x           SLS L  Blue mat home            SLS plyoback  nt nt           pods 3x 3x ea staggered, tandem,            Calf press: seat 17 nt nt           Leg Press: seat 4, pad 8 120# /30x 120#/30           SL press 70# /30x 70#/30           Heel raises nt 30x                        Gait Training                                       Modalities                          Ice post PRN             HEP: SLS LLE, TB inv/ever, step calf stretch

## 2021-01-29 ENCOUNTER — OFFICE VISIT (OUTPATIENT)
Dept: PHYSICAL THERAPY | Age: 51
End: 2021-01-29
Payer: OTHER MISCELLANEOUS

## 2021-01-29 DIAGNOSIS — S82.392D CLOSED FRACTURE OF POSTERIOR MALLEOLUS OF LEFT TIBIA WITH ROUTINE HEALING, SUBSEQUENT ENCOUNTER: Primary | ICD-10-CM

## 2021-01-29 DIAGNOSIS — T14.8XXA CRUSH INJURY: ICD-10-CM

## 2021-01-29 DIAGNOSIS — M25.572 CHRONIC PAIN OF LEFT ANKLE: ICD-10-CM

## 2021-01-29 DIAGNOSIS — G89.29 CHRONIC PAIN OF LEFT ANKLE: ICD-10-CM

## 2021-01-29 PROCEDURE — 97140 MANUAL THERAPY 1/> REGIONS: CPT | Performed by: PHYSICAL THERAPIST

## 2021-01-29 PROCEDURE — 97112 NEUROMUSCULAR REEDUCATION: CPT | Performed by: PHYSICAL THERAPIST

## 2021-01-29 PROCEDURE — 97110 THERAPEUTIC EXERCISES: CPT | Performed by: PHYSICAL THERAPIST

## 2021-01-29 NOTE — PROGRESS NOTES
Daily Note     Today's date: 2021  Patient name: Justyn Colon  : 1970  MRN: [id number]  Referring provider: Shahram Wilson MD  Dx:   Encounter Diagnosis     ICD-10-CM    1  Closed fracture of posterior malleolus of left tibia with routine healing, subsequent encounter  S82 392D    2  Crush injury  T14  8XXA    3  Chronic pain of left ankle  M25 572     G89 29        Start Time: 1600  Stop Time: 1700  Total time in clinic (min): 60 minutes    Subjective: Patient reports the KT tape helped  Objective: See treatment diary below      Assessment: Tolerated treatment well  Patient would benefit from continued PT  Balance agilities were challenging but manageable  Patient has more AROM today in left ankle  Tender in lateral gastroc with tightness in peroneal tendon/muscle  Plan: Continue per plan of care        Precautions:standard      Manuals           Left ankle foot 5 5 5          STM left calf 10 10 10                       Neuro Re-Ed             Ktape   10' 10                                                 Ther Ex             Towel curls             Towel slides inv/ever             Hip abd 50# 30x 50#/30 50# 30x          Hip add 50# 30x 50#/30 50# 30x          slant L2  30"/4 30"x4 30" 4x          Disc fwrd/bwrd nt 20x ea nt          Disc CW/CCW nt 20x ea nt                       Bike home            Ankle isolator IV/EV nt 1 5#/20x ea 2# 30x          Neuro Re-Ed  Side/side 3x 3x           Tandem For/back 3x 3x Blue 3x           SLS L mat plyo     Blue 20x          SLS plyoback  nt nt           pods 3x 3x ea staggered, tandem,  5x          Calf press: seat 17 nt nt nt          Leg Press: seat 4, pad 8 120# /30x 120#/30 120# 30x          SL press 70# /30x 70#/30 70# 30x          Heel raises nt 30x           BOSU lateral    10x ea                                                 Modalities                          Ice post PRN             HEP: SLS LLE, TB inv/ever, step calf stretch

## 2021-02-01 ENCOUNTER — APPOINTMENT (OUTPATIENT)
Dept: PHYSICAL THERAPY | Age: 51
End: 2021-02-01
Payer: OTHER MISCELLANEOUS

## 2021-02-03 ENCOUNTER — OFFICE VISIT (OUTPATIENT)
Dept: PHYSICAL THERAPY | Age: 51
End: 2021-02-03
Payer: OTHER MISCELLANEOUS

## 2021-02-03 DIAGNOSIS — T14.8XXA CRUSH INJURY: ICD-10-CM

## 2021-02-03 DIAGNOSIS — G89.29 CHRONIC PAIN OF LEFT ANKLE: ICD-10-CM

## 2021-02-03 DIAGNOSIS — S82.392D CLOSED FRACTURE OF POSTERIOR MALLEOLUS OF LEFT TIBIA WITH ROUTINE HEALING, SUBSEQUENT ENCOUNTER: Primary | ICD-10-CM

## 2021-02-03 DIAGNOSIS — M25.572 CHRONIC PAIN OF LEFT ANKLE: ICD-10-CM

## 2021-02-03 PROCEDURE — 97530 THERAPEUTIC ACTIVITIES: CPT

## 2021-02-03 PROCEDURE — 97112 NEUROMUSCULAR REEDUCATION: CPT

## 2021-02-03 PROCEDURE — 97110 THERAPEUTIC EXERCISES: CPT

## 2021-02-03 NOTE — PROGRESS NOTES
Daily Note     Today's date: 2/3/2021  Patient name: Yashira Partida  : 1970  MRN: [id number]  Referring provider: Gino Waller MD  Dx:   Encounter Diagnosis     ICD-10-CM    1  Closed fracture of posterior malleolus of left tibia with routine healing, subsequent encounter  S82 392D    2  Crush injury  T14  8XXA    3  Chronic pain of left ankle  M25 572     G89 29        Start Time: 1745  Stop Time:   Total time in clinic (min): 60 minutes    Subjective: patient reports getting stronger, notes the tape is helping  Objective: See treatment diary below      Assessment: Tolerated treatment well, challenged with balance activities surya sls, wbing left ankle  Does well with weights  Patient demonstrated fatigue post treatment and would benefit from continued PT      Plan: Continue per plan of care        Precautions:standard      Manuals  2/3         Left ankle foot 5 5 5 5         STM left calf 10 10 10 10                      Neuro Re-Ed             Ktape   10' 10 10'                                                Ther Ex             Towel curls             Towel slides inv/ever             Hip abd 50# 30x 50#/30 50# 30x 55/30         Hip add 50# 30x 50#/30 50# 30x 55/30         slant L2  30"/4 30"x4 30" 4x 30"x4         Disc fwrd/bwrd nt 20x ea nt nt         Disc CW/CCW nt 20x ea nt nt                      Bike home            Ankle isolator IV/EV nt 1 5#/20x ea 2# 30x 2#/30         Neuro Re-Ed  Side/side 3x 3x           Tandem For/back 3x 3x Blue 3x  3x         SLS L mat plyo     Blue 20x 30x         SLS plyoback  nt nt           pods 3x 3x ea staggered, tandem,  5x 5x         Calf press: seat 17 nt nt nt          Leg Press: seat 4, pad 8 120# /30x 120#/30 120# 30x 120/30         SL press 70# /30x 70#/30 70# 30x 70/30         Heel raises nt 30x           BOSU lateral    10x ea 15x ea                                                Modalities                          Ice post PRN HEP: SLS LLE, TB inv/ever, step calf stretch

## 2021-02-05 ENCOUNTER — OFFICE VISIT (OUTPATIENT)
Dept: PHYSICAL THERAPY | Age: 51
End: 2021-02-05
Payer: OTHER MISCELLANEOUS

## 2021-02-05 DIAGNOSIS — S82.392D CLOSED FRACTURE OF POSTERIOR MALLEOLUS OF LEFT TIBIA WITH ROUTINE HEALING, SUBSEQUENT ENCOUNTER: Primary | ICD-10-CM

## 2021-02-05 DIAGNOSIS — T14.8XXA CRUSH INJURY: ICD-10-CM

## 2021-02-05 DIAGNOSIS — G89.29 CHRONIC PAIN OF LEFT ANKLE: ICD-10-CM

## 2021-02-05 DIAGNOSIS — M25.572 CHRONIC PAIN OF LEFT ANKLE: ICD-10-CM

## 2021-02-05 PROCEDURE — 97112 NEUROMUSCULAR REEDUCATION: CPT

## 2021-02-05 PROCEDURE — 97530 THERAPEUTIC ACTIVITIES: CPT

## 2021-02-05 PROCEDURE — 97110 THERAPEUTIC EXERCISES: CPT

## 2021-02-05 NOTE — PROGRESS NOTES
Daily Note     Today's date: 2021  Patient name: Mercedes Singh  : 1970  MRN: [id number]  Referring provider: Javier Hatch MD  Dx:   Encounter Diagnosis     ICD-10-CM    1  Closed fracture of posterior malleolus of left tibia with routine healing, subsequent encounter  S82 392D    2  Crush injury  T14  8XXA    3  Chronic pain of left ankle  M25 572     G89 29        Start Time: 1615  Stop Time: 1700  Total time in clinic (min): 45 minutes    Subjective: Reports 5/10 pain at R ankle and along L lateral lower leg  Objective: See treatment diary below      Assessment: Tolerated treatment well  Attempted diagonals with agility ladder, some difficulty with coordination and decreased speed  Patient was able to achieve a quicker pace when performing FW and sideways  Challenged by SLS ball toss, unable to maintain balance for more than one rep when on foam, able to maintain for 3 reps when on floor  Some increased soreness post session, patient is motivated  Patient would benefit from continued PT  Plan: Continue per plan of care        Precautions:standard      Manuals 1/25 1/27 1/29 2/3 2/5        Left ankle foot 5 5 5 5 5'         STM left calf 10 10 10 10 8'                     Neuro Re-Ed             Ktape   10' 10 10' NT                                               Ther Ex             Towel curls             Towel slides inv/ever             Hip abd 50# 30x 50#/30 50# 30x 55/30 55# 30x        Hip add 50# 30x 50#/30 50# 30x 55/30 55# 30x        slant L2  30"/4 30"x4 30" 4x 30"x4 L3 30"x4        Disc fwrd/bwrd nt 20x ea nt nt NT        Disc CW/CCW nt 20x ea nt nt NT                     Bike home            Ankle isolator IV/EV nt 1 5#/20x ea 2# 30x 2#/30 2# 30x        Neuro Re-Ed  Side/side 3x 3x           Tandem For/back 3x 3x Blue 3x  3x NT        SLS L mat plyo     Blue 20x 30x Foam/floor 30x blue        SLS plyoback  nt nt           pods 3x 3x ea staggered, tandem,  5x 5x 5 laps   sideways, stagger, straight         Calf press: seat 17 nt nt nt          Leg Press: seat 4, pad 8 120# /30x 120#/30 120# 30x 120/30 120# 30x        SL press 70# /30x 70#/30 70# 30x 70/30 80# 30x        Heel raises nt 30x   With eccentric lower x30        BOSU lateral    10x ea 15x ea With R hip flex 20x         Agility ladder      FW/sidex2 laps ea         Alt toe taps at 4" step     x20                     Modalities                          Ice post PRN             HEP: SLS LLE, TB inv/ever, step calf stretch

## 2021-02-06 ENCOUNTER — OFFICE VISIT (OUTPATIENT)
Dept: PHYSICAL THERAPY | Age: 51
End: 2021-02-06
Payer: OTHER MISCELLANEOUS

## 2021-02-06 DIAGNOSIS — S82.392D CLOSED FRACTURE OF POSTERIOR MALLEOLUS OF LEFT TIBIA WITH ROUTINE HEALING, SUBSEQUENT ENCOUNTER: Primary | ICD-10-CM

## 2021-02-06 DIAGNOSIS — G89.29 CHRONIC PAIN OF LEFT ANKLE: ICD-10-CM

## 2021-02-06 DIAGNOSIS — M25.572 CHRONIC PAIN OF LEFT ANKLE: ICD-10-CM

## 2021-02-06 DIAGNOSIS — T14.8XXA CRUSH INJURY: ICD-10-CM

## 2021-02-06 PROCEDURE — 97110 THERAPEUTIC EXERCISES: CPT | Performed by: PHYSICAL THERAPIST

## 2021-02-06 PROCEDURE — 97530 THERAPEUTIC ACTIVITIES: CPT | Performed by: PHYSICAL THERAPIST

## 2021-02-06 NOTE — PROGRESS NOTES
Daily Note     Today's date: 2021  Patient name: Chary Coffman  : 1970  MRN: [id number]  Referring provider: Araseli Pang MD  Dx:   Encounter Diagnosis     ICD-10-CM    1  Closed fracture of posterior malleolus of left tibia with routine healing, subsequent encounter  S82 392D    2  Crush injury  T14  8XXA    3  Chronic pain of left ankle  M25 572     G89 29        Start Time: 1045  Stop Time: 1145  Total time in clinic (min): 60 minutes    Subjective: Patient states pain in anterior left ankle with weight bearing and exercises  Patient pushes through activity despite the pain  Patient declines to have tape today  Objective: See treatment diary below      Assessment: Tolerated treatment well  Patient would benefit from continued PT  Patient felt the workout today and increased speed with agility drills today  Advised ice to ankle at home for pain  Plan: Continue per plan of care        Precautions:standard      Manuals 1/25 1/27 1/29 2/3 2/5 2/6       Left ankle foot 5 5 5 5 5'  5'       STM left calf 10 10 10 10 8' 5'                    Neuro Re-Ed             Ktape   10' 10 10' NT nt                                              Ther Ex             Towel curls             Towel slides inv/ever             Hip abd 50# 30x 50#/30 50# 30x 55/30 55# 30x 55# 30x       Hip add 50# 30x 50#/30 50# 30x 55/30 55# 30x 55# 30x       slant L2  30"/4 30"x4 30" 4x 30"x4 L3 30"x4 4x       Disc fwrd/bwrd nt 20x ea nt nt NT        Disc CW/CCW nt 20x ea nt nt NT                     Bike home            Ankle isolator IV/EV nt 1 5#/20x ea 2# 30x 2#/30 2# 30x 3# 30x       Neuro Re-Ed  Side/side 3x 3x    grapevine 3x       Tandem For/back 3x 3x Blue 3x  3x NT        SLS L mat plyo     Blue 20x 30x Foam/floor 30x blue 30x yellow       SLS plyoback  nt nt           pods 3x 3x ea staggered, tandem,  5x 5x 5 laps   sideways, stagger, straight  5x       Calf press: seat 17 nt nt nt   40# 30x       Leg Press: seat 4, pad 8 120# /30x 120#/30 120# 30x 120/30 120# 30x 120# 30x       SL press 70# /30x 70#/30 70# 30x 70/30 80# 30x 80# 30x       Heel raises nt 30x   With eccentric lower x30 30x       BOSU lateral    10x ea 15x ea With R hip flex 20x  20x       Agility ladder      FW/sidex2 laps ea  3x ea       Alt toe taps at 4" step     x20 30x       4 Quadrant fast step      30x for and side       Modalities                          Ice post PRN             HEP: SLS LLE, TB inv/ever, step calf stretch

## 2021-02-08 ENCOUNTER — OFFICE VISIT (OUTPATIENT)
Dept: PHYSICAL THERAPY | Age: 51
End: 2021-02-08
Payer: OTHER MISCELLANEOUS

## 2021-02-08 DIAGNOSIS — T14.8XXA CRUSH INJURY: ICD-10-CM

## 2021-02-08 DIAGNOSIS — S82.392D CLOSED FRACTURE OF POSTERIOR MALLEOLUS OF LEFT TIBIA WITH ROUTINE HEALING, SUBSEQUENT ENCOUNTER: Primary | ICD-10-CM

## 2021-02-08 DIAGNOSIS — M25.572 CHRONIC PAIN OF LEFT ANKLE: ICD-10-CM

## 2021-02-08 DIAGNOSIS — G89.29 CHRONIC PAIN OF LEFT ANKLE: ICD-10-CM

## 2021-02-08 PROCEDURE — 97110 THERAPEUTIC EXERCISES: CPT

## 2021-02-08 PROCEDURE — 97530 THERAPEUTIC ACTIVITIES: CPT

## 2021-02-08 PROCEDURE — 97112 NEUROMUSCULAR REEDUCATION: CPT

## 2021-02-08 NOTE — PROGRESS NOTES
Daily Note     Today's date: 2021  Patient name: Audra Bhatia  : 1970  MRN: [id number]  Referring provider: Evelin Jacobsen MD  Dx:   Encounter Diagnosis     ICD-10-CM    1  Closed fracture of posterior malleolus of left tibia with routine healing, subsequent encounter  S82 392D    2  Crush injury  T14  8XXA    3  Chronic pain of left ankle  M25 572     G89 29        Start Time:   Stop Time:   Total time in clinic (min): 45 minutes    Subjective: Reports 5/10 pain upon arrival        Objective: See treatment diary below      Assessment: Tolerated treatment well  Increased speed achieved with agility activities today  Cues to allow knees to bend a little during agility ladder, patient improved speed and technique  Will continue to progress as able, patient is motivated  No c/o elevated pain during session, some soreness noted post  Patient would benefit from continued PT  Plan: Continue per plan of care        Precautions:standard      Manuals 1/25 1/27 1/29 2/3 2/5 2/6 2/8      Left ankle foot 5 5 5 5 5'  5' 5'      STM left calf 10 10 10 10 8' 5' 5'                   Neuro Re-Ed             Ktape   10' 10 10' NT nt attempted                                              Ther Ex             Towel curls             Towel slides inv/ever             Hip abd 50# 30x 50#/30 50# 30x 55/30 55# 30x 55# 30x 60# 30x      Hip add 50# 30x 50#/30 50# 30x 55/30 55# 30x 55# 30x 60# 30x      slant L2  30"/4 30"x4 30" 4x 30"x4 L3 30"x4 4x 4x      Disc fwrd/bwrd nt 20x ea nt nt NT        Disc CW/CCW nt 20x ea nt nt NT                     Bike home            Ankle isolator IV/EV nt 1 5#/20x ea 2# 30x 2#/30 2# 30x 3# 30x NT      Neuro Re-Ed  Side/side 3x 3x    grapevine 3x 3x       Tandem For/back 3x 3x Blue 3x  3x NT        SLS L mat plyo     Blue 20x 30x Foam/floor 30x blue 30x yellow NT      SLS plyoback  nt nt           pods 3x 3x ea staggered, tandem,  5x 5x 5 laps   sideways, stagger, straight  5x 5x e    sideways, staggered, straight       Calf press: seat 17 nt nt nt   40# 30x 40# 30x      Leg Press: seat 4, pad 8 120# /30x 120#/30 120# 30x 120/30 120# 30x 120# 30x 120# 30x      SL press 70# /30x 70#/30 70# 30x 70/30 80# 30x 80# 30x 80# 30x       Heel raises nt 30x   With eccentric lower x30 30x NT      BOSU lateral    10x ea 15x ea With R hip flex 20x  20x With plyoball toss Blue      Agility ladder      FW/sidex2 laps ea  3x ea 2x ea FW/side/diag      Alt toe taps at 4" step     x20 30x 30x      4 Quadrant fast step      30x for and side 30x ea       Modalities                          Ice post PRN             HEP: SLS LLE, TB inv/ever, step calf stretch

## 2021-02-10 ENCOUNTER — OFFICE VISIT (OUTPATIENT)
Dept: PHYSICAL THERAPY | Age: 51
End: 2021-02-10
Payer: OTHER MISCELLANEOUS

## 2021-02-10 DIAGNOSIS — S82.392D CLOSED FRACTURE OF POSTERIOR MALLEOLUS OF LEFT TIBIA WITH ROUTINE HEALING, SUBSEQUENT ENCOUNTER: Primary | ICD-10-CM

## 2021-02-10 DIAGNOSIS — M25.572 CHRONIC PAIN OF LEFT ANKLE: ICD-10-CM

## 2021-02-10 DIAGNOSIS — G89.29 CHRONIC PAIN OF LEFT ANKLE: ICD-10-CM

## 2021-02-10 DIAGNOSIS — T14.8XXA CRUSH INJURY: ICD-10-CM

## 2021-02-10 PROCEDURE — 97112 NEUROMUSCULAR REEDUCATION: CPT

## 2021-02-10 PROCEDURE — 97110 THERAPEUTIC EXERCISES: CPT

## 2021-02-10 PROCEDURE — 97530 THERAPEUTIC ACTIVITIES: CPT

## 2021-02-10 NOTE — PROGRESS NOTES
Daily Note     Today's date: 2/10/2021  Patient name: Arleen Webb  : 1970  MRN: [id number]  Referring provider: Gina Birmingham MD  Dx:   Encounter Diagnosis     ICD-10-CM    1  Closed fracture of posterior malleolus of left tibia with routine healing, subsequent encounter  S82 392D    2  Crush injury  T14  8XXA    3  Chronic pain of left ankle  M25 572     G89 29        Start Time: 1740  Stop Time: 1830  Total time in clinic (min): 50 minutes    Subjective: no c/o pain today      Objective: See treatment diary below      Assessment: Tolerated treatment well ncreased speed achieved with agility activities today  Cues to allow knees to bend a little during agility ladder, patient improved speed and technique  Will continue to progress as able, patient is motivated  No c/o elevated pain during session, some soreness with agility ladder noted post  Patient demonstrated fatigue post treatment and would benefit from continued PT      Plan: Continue per plan of care        Precautions:standard      Manuals 1/25 1/27 1/29 2/3 2/5 2/6 2/8 2/10     Left ankle foot 5 5 5 5 5'  5' 5' 5'     STM left calf 10 10 10 10 8' 5' 5' 5'                  Neuro Re-Ed             Ktape   10' 10 10' NT nt attempted                                              Ther Ex             Towel curls             Towel slides inv/ever             Hip abd 50# 30x 50#/30 50# 30x 55/30 55# 30x 55# 30x 60# 30x 60#/30     Hip add 50# 30x 50#/30 50# 30x 55/30 55# 30x 55# 30x 60# 30x 60#/30     slant L2  30"/4 30"x4 30" 4x 30"x4 L3 30"x4 4x 4x 4x     Disc fwrd/bwrd nt 20x ea nt nt NT        Disc CW/CCW nt 20x ea nt nt NT                     Bike home            Ankle isolator IV/EV nt 1 5#/20x ea 2# 30x 2#/30 2# 30x 3# 30x NT 3#/30x     Neuro Re-Ed  Side/side 3x 3x    grapevine 3x 3x  3x     Tandem For/back 3x 3x Blue 3x  3x NT        SLS L mat plyo     Blue 20x 30x Foam/floor 30x blue 30x yellow NT      SLS plyoback  nt nt           pods 3x 3x ea staggered, tandem,  5x 5x 5 laps   sideways, stagger, straight  5x 5x e    sideways, staggered, straight  5x ea  sideways,staggered,straight     Calf press: seat 17 nt nt nt   40# 30x 40# 30x 45#/30     Leg Press: seat 4, pad 8 120# /30x 120#/30 120# 30x 120/30 120# 30x 120# 30x 120# 30x 125#/30     SL press 70# /30x 70#/30 70# 30x 70/30 80# 30x 80# 30x 80# 30x  80#/30     Heel raises nt 30x   With eccentric lower x30 30x NT      BOSU lateral    10x ea 15x ea With R hip flex 20x  20x With plyoball toss Blue 20x ea  Blue toss     Agility ladder      FW/sidex2 laps ea  3x ea 2x ea FW/side/diag 2x ea  Side/FW     Alt toe taps at 4" step     x20 30x 30x 30x     4 Quadrant fast step      30x for and side 30x ea  nt     Modalities                          Ice post PRN             HEP: SLS LLE, TB inv/ever, step calf stretch

## 2021-02-12 ENCOUNTER — OFFICE VISIT (OUTPATIENT)
Dept: PHYSICAL THERAPY | Age: 51
End: 2021-02-12
Payer: OTHER MISCELLANEOUS

## 2021-02-12 DIAGNOSIS — S82.392D CLOSED FRACTURE OF POSTERIOR MALLEOLUS OF LEFT TIBIA WITH ROUTINE HEALING, SUBSEQUENT ENCOUNTER: Primary | ICD-10-CM

## 2021-02-12 DIAGNOSIS — M25.572 CHRONIC PAIN OF LEFT ANKLE: ICD-10-CM

## 2021-02-12 DIAGNOSIS — T14.8XXA CRUSH INJURY: ICD-10-CM

## 2021-02-12 DIAGNOSIS — G89.29 CHRONIC PAIN OF LEFT ANKLE: ICD-10-CM

## 2021-02-12 PROCEDURE — 97140 MANUAL THERAPY 1/> REGIONS: CPT | Performed by: PHYSICAL THERAPIST

## 2021-02-12 PROCEDURE — 97110 THERAPEUTIC EXERCISES: CPT | Performed by: PHYSICAL THERAPIST

## 2021-02-12 PROCEDURE — 97530 THERAPEUTIC ACTIVITIES: CPT | Performed by: PHYSICAL THERAPIST

## 2021-02-12 NOTE — PROGRESS NOTES
Daily Note     Today's date: 2021  Patient name: Audra Bhatia  : 1970  MRN: [id number]  Referring provider: Evelin Jacobsen MD  Dx:   Encounter Diagnosis     ICD-10-CM    1  Closed fracture of posterior malleolus of left tibia with routine healing, subsequent encounter  S82 392D    2  Crush injury  T14  8XXA    3  Chronic pain of left ankle  M25 572     G89 29        Start Time: 1510  Stop Time: 1610  Total time in clinic (min): 60 minutes    Subjective: patient reports he is getting stronger  Objective: See treatment diary below      Assessment: Tolerated treatment well  Patient would benefit from continued PT patient favors left foot with impacting agilities  Needs encouragement and cues to use feet equally  Plan: Continue per plan of care        Precautions:standard      Manuals 1/25 1/27 1/29 2/3 2/5 2/6 2/8 2/10 2/12    Left ankle foot 5 5 5 5 5'  5' 5' 5' 5    STM left calf 10 10 10 10 8' 5' 5' 5' 5                 Neuro Re-Ed             Ktape   10' 10 10' NT nt attempted                                              Ther Ex             Towel curls             Towel slides inv/ever             Hip abd 50# 30x 50#/30 50# 30x 55/30 55# 30x 55# 30x 60# 30x 60#/30 60# 30x    Hip add 50# 30x 50#/30 50# 30x 55/30 55# 30x 55# 30x 60# 30x 60#/30 60# 30x    slant L2  30"/4 30"x4 30" 4x 30"x4 L3 30"x4 4x 4x 4x 4x    Disc fwrd/bwrd nt 20x ea nt nt NT        Disc CW/CCW nt 20x ea nt nt NT                     Bike home            Ankle isolator IV/EV nt 1 5#/20x ea 2# 30x 2#/30 2# 30x 3# 30x NT 3#/30x 3# 30x    Neuro Re-Ed  Side/side 3x 3x    grapevine 3x 3x  3x 3x    Tandem For/back 3x 3x Blue 3x  3x NT        SLS L mat plyo     Blue 20x 30x Foam/floor 30x blue 30x yellow NT  30x orange    High marches         30x    pods 3x 3x ea staggered, tandem,  5x 5x 5 laps   sideways, stagger, straight  5x 5x e    sideways, staggered, straight  5x ea  sideways,staggered,straight 4x    Calf press: seat 17 nt nt nt   40# 30x 40# 30x 45#/30 45# 30x    Leg Press: seat 4, pad 8 120# /30x 120#/30 120# 30x 120/30 120# 30x 120# 30x 120# 30x 125#/30 125# 30x    SL press 70# /30x 70#/30 70# 30x 70/30 80# 30x 80# 30x 80# 30x  80#/30 80# 30x    Heel raises nt 30x   With eccentric lower x30 30x NT      BOSU lateral    10x ea 15x ea With R hip flex 20x  20x With plyoball toss Blue 20x ea  Blue toss 20x    Agility ladder      FW/sidex2 laps ea  3x ea 2x ea FW/side/diag 2x ea  Side/FW 2x ea    Alt toe taps at 4" step     x20 30x 30x 30x 30x    4 Quadrant fast step      30x for and side 30x ea  nt nt    Double leg hop         2x                 Ice post PRN             HEP: SLS LLE, TB inv/ever, step calf stretch

## 2021-02-15 ENCOUNTER — OFFICE VISIT (OUTPATIENT)
Dept: PHYSICAL THERAPY | Age: 51
End: 2021-02-15
Payer: OTHER MISCELLANEOUS

## 2021-02-15 DIAGNOSIS — G89.29 CHRONIC PAIN OF LEFT ANKLE: ICD-10-CM

## 2021-02-15 DIAGNOSIS — T14.8XXA CRUSH INJURY: ICD-10-CM

## 2021-02-15 DIAGNOSIS — S82.392D CLOSED FRACTURE OF POSTERIOR MALLEOLUS OF LEFT TIBIA WITH ROUTINE HEALING, SUBSEQUENT ENCOUNTER: Primary | ICD-10-CM

## 2021-02-15 DIAGNOSIS — M25.572 CHRONIC PAIN OF LEFT ANKLE: ICD-10-CM

## 2021-02-15 PROCEDURE — 97112 NEUROMUSCULAR REEDUCATION: CPT

## 2021-02-15 PROCEDURE — 97110 THERAPEUTIC EXERCISES: CPT

## 2021-02-15 PROCEDURE — 97530 THERAPEUTIC ACTIVITIES: CPT

## 2021-02-15 NOTE — PROGRESS NOTES
Daily Note     Today's date: 2/15/2021  Patient name: Prashanth Richmond  : 1970  MRN: [id number]  Referring provider: Shyann Cleaning MD  Dx:   Encounter Diagnosis     ICD-10-CM    1  Closed fracture of posterior malleolus of left tibia with routine healing, subsequent encounter  S82 392D    2  Crush injury  T14  8XXA    3  Chronic pain of left ankle  M25 572     G89 29        Start Time: 1430  Stop Time: 1520  Total time in clinic (min): 50 minutes    Subjective: Reports 5/10 pain       Objective: See treatment diary below      Assessment: Tolerated treatment well  Difficulty with hopping activities, leads with R foot and is unable to land B  Cues for carryover of exercises and proper form  Demonstrates increased speed with agility exercises but does continue to hesitate at times  No significant changes in pain post session  Also challenged by SLS on foam with ball toss, able to maintain for about 3 tosses before needing to place opposite foot down to regain balance  Patient would benefit from continued PT      Plan: Continue per plan of care        Precautions:standard      Manuals 1/25 1/27 1/29 2/3 2/5 2/6 2/8 2/10 2/12 2/15   Left ankle foot 5 5 5 5 5'  5' 5' 5' 5 5'   STM left calf 10 10 10 10 8' 5' 5' 5' 5 5'                Neuro Re-Ed             Ktape   10' 10 10' NT nt attempted                                              Ther Ex             Towel curls             Towel slides inv/ever             Hip abd 50# 30x 50#/30 50# 30x 55/30 55# 30x 55# 30x 60# 30x 60#/30 60# 30x 60# 30x   Hip add 50# 30x 50#/30 50# 30x 55/30 55# 30x 55# 30x 60# 30x 60#/30 60# 30x 60# 30x   slant L2  30"/4 30"x4 30" 4x 30"x4 L3 30"x4 4x 4x 4x 4x 4x   Disc fwrd/bwrd nt 20x ea nt nt NT        Disc CW/CCW nt 20x ea nt nt NT                     Bike home            Ankle isolator IV/EV nt 1 5#/20x ea 2# 30x 2#/30 2# 30x 3# 30x NT 3#/30x 3# 30x 3# 30x   Neuro Re-Ed             Side/side 3x 3x    grapevine 3x 3x  3x 3x 3x    Tandem For/back 3x 3x Blue 3x  3x NT        SLS L mat plyo     Blue 20x 30x Foam/floor 30x blue 30x yellow NT  30x orange SLS orange ball 30x   High marches         30x    pods 3x 3x ea staggered, tandem,  5x 5x 5 laps   sideways, stagger, straight  5x 5x e    sideways, staggered, straight  5x ea  sideways,staggered,straight 4x 5x ea with blue MB   Calf press: seat 17 nt nt nt   40# 30x 40# 30x 45#/30 45# 30x 45# 30x   Leg Press: seat 4, pad 8 120# /30x 120#/30 120# 30x 120/30 120# 30x 120# 30x 120# 30x 125#/30 125# 30x 125# 30x   SL press 70# /30x 70#/30 70# 30x 70/30 80# 30x 80# 30x 80# 30x  80#/30 80# 30x 80# 30x   Heel raises nt 30x   With eccentric lower x30 30x NT      BOSU lateral    10x ea 15x ea With R hip flex 20x  20x With plyoball toss Blue 20x ea  Blue toss 20x 20x ea with blue MB toss    Agility ladder      FW/sidex2 laps ea  3x ea 2x ea FW/side/diag 2x ea  Side/FW 2x ea 2 laps hop, FW, lateral, diag   Alt toe taps at 4" step     x20 30x 30x 30x 30x 30x   4 Quadrant fast step      30x for and side 30x ea  nt nt 30x ea   Double leg hop         2x See above                Ice post PRN             HEP: SLS LLE, TB inv/ever, step calf stretch

## 2021-02-17 ENCOUNTER — OFFICE VISIT (OUTPATIENT)
Dept: PHYSICAL THERAPY | Age: 51
End: 2021-02-17
Payer: OTHER MISCELLANEOUS

## 2021-02-17 DIAGNOSIS — T14.8XXA CRUSH INJURY: ICD-10-CM

## 2021-02-17 DIAGNOSIS — G89.29 CHRONIC PAIN OF LEFT ANKLE: ICD-10-CM

## 2021-02-17 DIAGNOSIS — S82.392D CLOSED FRACTURE OF POSTERIOR MALLEOLUS OF LEFT TIBIA WITH ROUTINE HEALING, SUBSEQUENT ENCOUNTER: Primary | ICD-10-CM

## 2021-02-17 DIAGNOSIS — M25.572 CHRONIC PAIN OF LEFT ANKLE: ICD-10-CM

## 2021-02-17 PROCEDURE — 97112 NEUROMUSCULAR REEDUCATION: CPT

## 2021-02-17 PROCEDURE — 97110 THERAPEUTIC EXERCISES: CPT

## 2021-02-17 PROCEDURE — 97530 THERAPEUTIC ACTIVITIES: CPT

## 2021-02-17 NOTE — PROGRESS NOTES
Daily Note     Today's date: 2021  Patient name: Hossein Lorenzana  : 1970  MRN: [id number]  Referring provider: Zay Soria MD  Dx:   Encounter Diagnosis     ICD-10-CM    1  Closed fracture of posterior malleolus of left tibia with routine healing, subsequent encounter  S82 392D    2  Crush injury  T14  8XXA    3  Chronic pain of left ankle  M25 572     G89 29        Start Time:   Stop Time:   Total time in clinic (min): 50 minutes    Subjective: patient reports about the same in the left ankle, pain and stiffness  Notes getting stronger  Objective: See treatment diary below      Assessment: Tolerated treatment well  Difficulty with hopping activities, leads with R foot and is unable to land B  Cues for carryover of exercises and proper form  Demonstrates increased speed with agility exercises but does continue to hesitate at times  Patient demonstrated fatigue post treatment, exhibited good technique with therapeutic exercises and would benefit from continued PT      Plan: Continue per plan of care        Precautions:standard      Manuals 2/17    1/29 2/3 2/5 2/6 2/8 2/10 2/12 2/15   Left ankle foot 5 5 5 5 5'  5' 5' 5' 5 5'   STM left calf 10 10 10 10 8' 5' 5' 5' 5 5'                Neuro Re-Ed             Ktape   10' 10 10' NT nt attempted                                              Ther Ex             Towel curls             Towel slides inv/ever             Hip abd 50# 30x 50#/30 50# 30x 55/30 55# 30x 55# 30x 60# 30x 60#/30 60# 30x 60# 30x   Hip add 50# 30x 50#/30 50# 30x 55/30 55# 30x 55# 30x 60# 30x 60#/30 60# 30x 60# 30x   slant 30"x4 30"x4 30" 4x 30"x4 L3 30"x4 4x 4x 4x 4x 4x   Disc fwrd/bwrd nt 20x ea nt nt NT        Disc CW/CCW nt 20x ea nt nt NT                     Bike home            Ankle isolator IV/EV 3#/30x ea 1 5#/20x ea 2# 30x 2#/30 2# 30x 3# 30x NT 3#/30x 3# 30x 3# 30x   Neuro Re-Ed             Side/side 3x 3x    grapevine 3x 3x  3x 3x 3x    Tandem For/back NT 3x Blue 3x  3x NT        SLS L mat plyo   SLS 30x  Orange b  Blue 20x 30x Foam/floor 30x blue 30x yellow NT  30x orange SLS orange ball 30x   High marches         30x    pods 5x ea with blue MB 3x ea staggered, tandem,  5x 5x 5 laps   sideways, stagger, straight  5x 5x e    sideways, staggered, straight  5x ea  sideways,staggered,straight 4x 5x ea with blue MB   Calf press: seat 17 50#/30 nt nt   40# 30x 40# 30x 45#/30 45# 30x 45# 30x   Leg Press: seat 4, pad 8 nt 120#/30 120# 30x 120/30 120# 30x 120# 30x 120# 30x 125#/30 125# 30x 125# 30x   SL press nt 70#/30 70# 30x 70/30 80# 30x 80# 30x 80# 30x  80#/30 80# 30x 80# 30x   Heel raises nt 30x   With eccentric lower x30 30x NT      BOSU lateral  20x ea with blue MB toss   10x ea 15x ea With R hip flex 20x  20x With plyoball toss Blue 20x ea  Blue toss 20x 20x ea with blue MB toss    Agility ladder  2 laps hop, FW, lateral, diag    FW/sidex2 laps ea  3x ea 2x ea FW/side/diag 2x ea  Side/FW 2x ea  2 laps hop, FW, lateral, diag   Alt toe taps at 4" step 30x    x20 30x 30x 30x 30x 30x   4 Quadrant fast step nt     30x for and side 30x ea  nt nt 30x ea   Double leg hop         2x See above                Ice post PRN             HEP: SLS LLE, TB inv/ever, step calf stretch

## 2021-02-19 ENCOUNTER — APPOINTMENT (OUTPATIENT)
Dept: PHYSICAL THERAPY | Age: 51
End: 2021-02-19
Payer: OTHER MISCELLANEOUS

## 2021-02-19 NOTE — PROGRESS NOTES
Daily Note     Today's date: 2021  Patient name: Chary Coffman  : 1970  MRN: [id number]  Referring provider: Araseli Pang MD  Dx:   Encounter Diagnosis     ICD-10-CM    1  Closed fracture of posterior malleolus of left tibia with routine healing, subsequent encounter  S82 392D    2  Crush injury  T14  8XXA    3  Chronic pain of left ankle  M25 572     G89 29                   Subjective: ***      Objective: See treatment diary below      Assessment: Tolerated treatment {Tolerated treatment :0237825968}   Patient {assessment:1916092885}      Plan: {PLAN:2111740716}     Precautions:standard      Manuals 2/17    1/29 2/3 2/5 2/6 2/8 2/10 2/12 2/15   Left ankle foot 5 5 5 5 5'  5' 5' 5' 5 5'   STM left calf 10 10 10 10 8' 5' 5' 5' 5 5'                Neuro Re-Ed             Ktape   10' 10 10' NT nt attempted                                              Ther Ex             Towel curls             Towel slides inv/ever             Hip abd 50# 30x 50#/30 50# 30x 55/30 55# 30x 55# 30x 60# 30x 60#/30 60# 30x 60# 30x   Hip add 50# 30x 50#/30 50# 30x 55/30 55# 30x 55# 30x 60# 30x 60#/30 60# 30x 60# 30x   slant 30"x4 30"x4 30" 4x 30"x4 L3 30"x4 4x 4x 4x 4x 4x   Disc fwrd/bwrd nt 20x ea nt nt NT        Disc CW/CCW nt 20x ea nt nt NT                     Bike home            Ankle isolator IV/EV 3#/30x ea 1 5#/20x ea 2# 30x 2#/30 2# 30x 3# 30x NT 3#/30x 3# 30x 3# 30x   Neuro Re-Ed             Side/side 3x 3x    grapevine 3x 3x  3x 3x 3x    Tandem For/back NT 3x Blue 3x  3x NT        SLS L mat plyo   SLS 30x  Orange b  Blue 20x 30x Foam/floor 30x blue 30x yellow NT  30x orange SLS orange ball 30x   High marches         30x    pods 5x ea with blue MB 3x ea staggered, tandem,  5x 5x 5 laps   sideways, stagger, straight  5x 5x e    sideways, staggered, straight  5x ea  sideways,staggered,straight 4x 5x ea with blue MB   Calf press: seat 17 50#/30 nt nt   40# 30x 40# 30x 45#/30 45# 30x 45# 30x   Leg Press: seat 4, pad 8 nt 120#/30 120# 30x 120/30 120# 30x 120# 30x 120# 30x 125#/30 125# 30x 125# 30x   SL press nt 70#/30 70# 30x 70/30 80# 30x 80# 30x 80# 30x  80#/30 80# 30x 80# 30x   Heel raises nt 30x   With eccentric lower x30 30x NT      BOSU lateral  20x ea with blue MB toss   10x ea 15x ea With R hip flex 20x  20x With plyoball toss Blue 20x ea  Blue toss 20x 20x ea with blue MB toss    Agility ladder  2 laps hop, FW, lateral, diag    FW/sidex2 laps ea  3x ea 2x ea FW/side/diag 2x ea  Side/FW 2x ea  2 laps hop, FW, lateral, diag   Alt toe taps at 4" step 30x    x20 30x 30x 30x 30x 30x   4 Quadrant fast step nt     30x for and side 30x ea  nt nt 30x ea   Double leg hop         2x See above                Ice post PRN             HEP: SLS LLE, TB inv/ever, step calf stretch

## 2021-02-20 ENCOUNTER — OFFICE VISIT (OUTPATIENT)
Dept: PHYSICAL THERAPY | Age: 51
End: 2021-02-20
Payer: OTHER MISCELLANEOUS

## 2021-02-20 DIAGNOSIS — T14.8XXA CRUSH INJURY: ICD-10-CM

## 2021-02-20 DIAGNOSIS — M25.572 CHRONIC PAIN OF LEFT ANKLE: ICD-10-CM

## 2021-02-20 DIAGNOSIS — S82.392D CLOSED FRACTURE OF POSTERIOR MALLEOLUS OF LEFT TIBIA WITH ROUTINE HEALING, SUBSEQUENT ENCOUNTER: Primary | ICD-10-CM

## 2021-02-20 DIAGNOSIS — G89.29 CHRONIC PAIN OF LEFT ANKLE: ICD-10-CM

## 2021-02-20 PROCEDURE — 97110 THERAPEUTIC EXERCISES: CPT

## 2021-02-20 PROCEDURE — 97530 THERAPEUTIC ACTIVITIES: CPT

## 2021-02-20 PROCEDURE — 97112 NEUROMUSCULAR REEDUCATION: CPT

## 2021-02-20 NOTE — PROGRESS NOTES
Daily Note     Today's date: 2021  Patient name: Alex Tuttle  : 1970  MRN: [id number]  Referring provider: Aura Barrera MD  Dx:   Encounter Diagnosis     ICD-10-CM    1  Closed fracture of posterior malleolus of left tibia with routine healing, subsequent encounter  S82 392D    2  Crush injury  T14  8XXA    3  Chronic pain of left ankle  M25 572     G89 29        Start Time: 0800  Stop Time: 0855  Total time in clinic (min): 55 minutes    Subjective: Reports 5/10 pain  States his pain comes and goes  Objective: See treatment diary below      Assessment: Tolerated treatment well  Continues to avoid pushing off on L foot with agility exercises but has increased speed  Challenged by SLS on foam with ball toss, requested to use a lighter ball today  No c/o elevated pain post session  Patient would benefit from continued PT      Plan: Continue per plan of care        Precautions:standard      Manuals   2/3 2/5 2/6 2/8 2/10 2/12 2/15   Left ankle foot 5 5  5 5'  5' 5' 5' 5 5'   STM left calf 10 5'   10 8' 5' 5' 5' 5 5'                Neuro Re-Ed             Ktape     10' NT nt attempted                                              Ther Ex             Towel curls             Towel slides inv/ever             Hip abd 50# 30x 60#/30  55/30 55# 30x 55# 30x 60# 30x 60#/30 60# 30x 60# 30x   Hip add 50# 30x 60#/30  55/30 55# 30x 55# 30x 60# 30x 60#/30 60# 30x 60# 30x   slant 30"x4 30"x4  30"x4 L3 30"x4 4x 4x 4x 4x 4x   Disc fwrd/bwrd nt   nt NT        Disc CW/CCW nt   nt NT                     Bike home            Ankle isolator IV/EV 3#/30x ea 3#/20x ea  2#/30 2# 30x 3# 30x NT 3#/30x 3# 30x 3# 30x   Neuro Re-Ed             Ideal  3x 3x    grapevine 3x 3x  3x 3x 3x    Tandem For/back NT   3x NT        SLS L mat plyo   SLS 30x  Orange b 30x SLS foam, blue  30x Foam/floor 30x blue 30x yellow NT  30x orange SLS orange ball 30x   High marches  30x       30x 30x   pods 5x ea with blue MB 5x ea staggered, tandem, side (blue MB)  5x 5 laps   sideways, stagger, straight  5x 5x e    sideways, staggered, straight  5x ea  sideways,staggered,straight 4x 5x ea with blue MB   Calf press: seat 17 50#/30 50# 30x    40# 30x 40# 30x 45#/30 45# 30x 45# 30x   Leg Press: seat 4, pad 8 nt 125#/30  120/30 120# 30x 120# 30x 120# 30x 125#/30 125# 30x 125# 30x   SL press nt 80#/30  70/30 80# 30x 80# 30x 80# 30x  80#/30 80# 30x 80# 30x   Heel raises nt    With eccentric lower x30 30x NT      BOSU lateral with MB toss (plyoback)  20x ea with blue MB toss  20x ea with blue   15x ea With R hip flex 20x  20x With plyoball toss Blue 20x ea  Blue toss 20x 20x ea with blue MB toss    Agility ladder  2 laps hop, FW, lateral, diag 2 laps hop, FW, lateral, diag   FW/sidex2 laps ea  3x ea 2x ea FW/side/diag 2x ea  Side/FW 2x ea  2 laps hop, FW, lateral, diag   Alt toe taps at 4" step 30x 30x   x20 30x 30x 30x 30x 30x   4 Quadrant fast step nt 30x ea    30x for and side 30x ea  nt nt 30x ea   4 quadrant hop (FW, side, BW, side)   10x            Double leg hop         2x See above                Ice post PRN             HEP: SLS LLE, TB inv/ever, step calf stretch

## 2021-03-02 ENCOUNTER — OFFICE VISIT (OUTPATIENT)
Dept: OBGYN CLINIC | Facility: CLINIC | Age: 51
End: 2021-03-02
Payer: OTHER MISCELLANEOUS

## 2021-03-02 VITALS — HEIGHT: 75 IN | WEIGHT: 235 LBS | BODY MASS INDEX: 29.22 KG/M2

## 2021-03-02 DIAGNOSIS — S82.392D CLOSED FRACTURE OF POSTERIOR MALLEOLUS OF LEFT TIBIA WITH ROUTINE HEALING, SUBSEQUENT ENCOUNTER: Primary | ICD-10-CM

## 2021-03-02 PROCEDURE — 99213 OFFICE O/P EST LOW 20 MIN: CPT | Performed by: ORTHOPAEDIC SURGERY

## 2021-03-02 NOTE — PROGRESS NOTES
Assessment:     1  Closed fracture of posterior malleolus of left tibia with routine healing, subsequent encounter          Plan:     Problem List Items Addressed This Visit        Musculoskeletal and Integument    Closed fracture of posterior malleolus of left tibia - Primary    Relevant Orders    Ambulatory referral to Physical Therapy           52 y o  male with a left leg crush injury and posterior malleolus fracture  Patient shows significant improvement now that he has been working with physical therapy, we did discuss that it is important that he continue working with them  He shows excellent motivation on his home exercise program, but the physical therapy will add significantly to this  I will plan on seeing him back in two months for repeat evaluation  No x-rays will be needed  He will continue his current work restrictions, new note was given  Patient ID: Jadon Jordan is a 52 y o  male  Chief Complaint:  Left ankle follow up     HPI:  52 y o  male presents to the office today for a follow up regarding a right ankle crush injury and posterior malleolus fracture due to a work related injury  Today he notes that he is feeling improvement overall  He has finally been able to start working with physical therapy and feels that they are helping him significantly  He is working on strengthening  He is doing a home exercise program   Feels like there is a lot still to improve upon including his strength in tissue mobilization  He has been using a massage gun on his anterior tibialis and feels that this is helping him as well        Allergy:  No Known Allergies    Medications:  all current active meds have been reviewed    Past Medical History:  Past Medical History:   Diagnosis Date    Asthma        Past Surgical History:  Past Surgical History:   Procedure Laterality Date    COLONOSCOPY         Family History:  Family History   Problem Relation Age of Onset    Diabetes Mother     Cancer Father        Social History:  Social History     Substance and Sexual Activity   Alcohol Use Yes    Comment: socially     Social History     Substance and Sexual Activity   Drug Use No     Social History     Tobacco Use   Smoking Status Never Smoker   Smokeless Tobacco Never Used           ROS:  Review of Systems   Constitutional: Negative  HENT: Negative  Eyes: Negative  Respiratory: Negative  Cardiovascular: Negative  Gastrointestinal: Negative  Endocrine: Negative  Genitourinary: Negative  Musculoskeletal: Positive for arthralgias  Skin: Negative  Allergic/Immunologic: Negative  Neurological: Negative  Hematological: Negative  Psychiatric/Behavioral: Negative  Objective:  BP Readings from Last 1 Encounters:   11/02/20 148/88      Wt Readings from Last 1 Encounters:   03/02/21 107 kg (235 lb)        BMI:   Estimated body mass index is 29 37 kg/m² as calculated from the following:    Height as of this encounter: 6' 3" (1 905 m)  Weight as of this encounter: 107 kg (235 lb)  EXAM:   Physical Exam  Left Ankle Exam   Swelling: none    Range of Motion   The patient has normal left ankle ROM  Other   Erythema: absent  Scars: absent  Sensation: normal  Pulse: present    Comments:    Tenderness along the anterior ankle, anterior tibialis muscle and posterior and the calf and Achilles tendon region    Patient shows improvement in his overall strength, still with quick fatigue, but improved from prior exam

## 2021-03-02 NOTE — LETTER
March 2, 2021     Patient: Manuelito Jones   YOB: 1970   Date of Visit: 3/2/2021       To Whom it May Concern:    Manuelito Jones is under my professional care  He was seen in my office on 3/2/2021  He may return to work on 3/2/21 with the following restrictions: no standing more than 45 minutes/hour no lifting pushing or pulling more than 35 lb  If you have any questions or concerns, please don't hesitate to call           Sincerely,          Trinidad Mayorga MD        CC: Manuelito Karen

## 2021-03-15 NOTE — PROGRESS NOTES
Patient has not returned for further PT  Patient's visits were used and he needed further authorization  D/c PT at this time

## 2021-03-29 ENCOUNTER — TELEPHONE (OUTPATIENT)
Dept: OBGYN CLINIC | Facility: HOSPITAL | Age: 51
End: 2021-03-29

## 2021-03-29 NOTE — TELEPHONE ENCOUNTER
Dr Sanjay Ruiz    Patient is asking for a C4 authorization form to request to extend PT to be sent to the insurance company  This is a  case       Patient didn't have the fax #      Lakshmi Turner # [phone]

## 2021-03-30 NOTE — TELEPHONE ENCOUNTER
Viktoria found the form and will complete it then forward [facility] for approval [facility]

## 2021-04-21 ENCOUNTER — TELEPHONE (OUTPATIENT)
Dept: OBGYN CLINIC | Facility: OTHER | Age: 51
End: 2021-04-21

## 2021-04-21 NOTE — TELEPHONE ENCOUNTER
Patient called in asking if Dr Sherita Wilson can write a letter for his job  He needs it to say he is allowed to return to work without restrictions asap      C/b # [phone] , can leave a message

## 2021-05-13 ENCOUNTER — OFFICE VISIT (OUTPATIENT)
Dept: OBGYN CLINIC | Facility: CLINIC | Age: 51
End: 2021-05-13
Payer: OTHER MISCELLANEOUS

## 2021-05-13 VITALS — BODY MASS INDEX: 29.22 KG/M2 | WEIGHT: 235 LBS | HEIGHT: 75 IN

## 2021-05-13 DIAGNOSIS — S82.392D CLOSED FRACTURE OF POSTERIOR MALLEOLUS OF LEFT TIBIA WITH ROUTINE HEALING, SUBSEQUENT ENCOUNTER: Primary | ICD-10-CM

## 2021-05-13 PROCEDURE — 99213 OFFICE O/P EST LOW 20 MIN: CPT | Performed by: ORTHOPAEDIC SURGERY

## 2021-05-13 NOTE — PROGRESS NOTES
Assessment:     1  Closed fracture of posterior malleolus of left tibia with routine healing, subsequent encounter          Plan:     Problem List Items Addressed This Visit        Musculoskeletal and Integument    Closed fracture of posterior malleolus of left tibia - Primary    Relevant Orders    Ambulatory referral to Physical Therapy           52 YOM presented to office today for follow up of a posterior malleolus fracture and crush injury of the left lower extremity  DOI 5/14/2021  The patient is doing well, however he stated that he was unable to continue with PT due to paperwork issues  The patient was provided with a new PT script today  He was instructed to also continue with ROM and strength home exercises  The patient will return to the office in 3 months for recheck  No xrays needed at that time  The patient was seen and examined today in office by Dr Carrie Fernandes and myself  Patient ID: Kaiser Malcolm is a 52 y o  male  Chief Complaint:  Follow up left ankle injury    Subjective:    43-year-old male presents office today for follow-up of a left posterior malleolus fracture and crush injury of the left lower extremity  Date of injury 05/14/2021  The patient states that he continues to have pain along his anterior and lateral ankle with decreased ROM  The pain worsens by the end of the day after he has been on his feet  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  At last office visit the patient was given a script to continue with PT, though the patient states that due to paperwork issues he was unable to go to PT  The patient denies any swelling, numbness or tingling in the lower extremity  The patient has been massaging his foot for pain relief  He has no other complains or questions today  Allergy:  No Known Allergies    Medications:  all current active meds have been reviewed    ROS:  Review of Systems   Musculoskeletal: Positive for arthralgias     All other systems reviewed and are negative  Objective:  BP Readings from Last 1 Encounters:   11/02/20 148/88      Wt Readings from Last 1 Encounters:   05/13/21 107 kg (235 lb)        Exam:   Physical Exam  Vitals signs reviewed  Left Ankle Exam     Tenderness   The patient is experiencing no tenderness  Other   Erythema: absent  Scars: absent  Sensation: normal  Pulse: present    Comments:  No lesions, erythema, ecchymosis, effusion, warmth, or other signs of infection  Sensation intact to light touch along the DP/SP/BEDOLLA/SA/T nerve distributions  Limited plantarflexion with pain  Full dorsiflexion, eversion, and inversion  Strong pedal pulse present  Brisk capillary refill in all toes  Negative Pallavi's sign  Radiographs:  No new images today

## 2021-05-13 NOTE — LETTER
May 13, 2021     Patient: Leonie Camacho   YOB: 1970   Date of Visit: 5/13/2021       To Whom it May Concern:    Leonie Camacho is under my professional care  He was seen in my office on 5/13/2021  He may return to work with full duty today, 5/13/2021, without restrictions  If you have any questions or concerns, please don't hesitate to call           Sincerely,          Lawyer Kvng MD        CC: No Recipients

## 2021-07-27 ENCOUNTER — TELEPHONE (OUTPATIENT)
Dept: OBGYN CLINIC | Facility: HOSPITAL | Age: 51
End: 2021-07-27

## 2021-07-27 NOTE — TELEPHONE ENCOUNTER
Thank you Cameron Head for adding the PT order I will also follow up with the patient for his Therapy location conformation  I believe he is [facility] and needs a form completed which his therapy dept would do  I will take care of this tomorrow

## 2021-07-27 NOTE — TELEPHONE ENCOUNTER
Patient is calling because we have to submit his pt script & an MG2 form to his work comp in order for him to go  Patient states that he thinks we should have this form already but he did not give it to us  Patient does not have a fax number for us to submit stating that we have all the paperwork & forms & contact information already       [phone]

## 2021-07-27 NOTE — TELEPHONE ENCOUNTER
There are 2 physical therapy prescriptions 1 from 03/02/2021 and 05/13/2021 from Dr Jimmie Donald on this patient  Unsure if he needs another PT script but those are both "future" and should be good  DOS is 1 the MA he is able to help Vince Daly with this issue or contact me typically request a new PT know if it is required would please see my previous reply

## 2021-07-27 NOTE — TELEPHONE ENCOUNTER
I cannot print & fax the PT script from my location  Patient was unclear as far as the remainder of the information that we also needed to send along with it & he was getting agitated with my questions about the fax number & the additional form that he stated needed to accompany it because he states that we have sent this previously

## 2021-07-29 NOTE — TELEPHONE ENCOUNTER
LVM for patient requesting PT information for where he is going and then will contact them to complete his form that is required

## 2021-11-02 ENCOUNTER — APPOINTMENT (OUTPATIENT)
Dept: RADIOLOGY | Facility: AMBULARY SURGERY CENTER | Age: 51
End: 2021-11-02
Attending: ORTHOPAEDIC SURGERY
Payer: OTHER MISCELLANEOUS

## 2021-11-02 ENCOUNTER — OFFICE VISIT (OUTPATIENT)
Dept: OBGYN CLINIC | Facility: CLINIC | Age: 51
End: 2021-11-02
Payer: COMMERCIAL

## 2021-11-02 VITALS — BODY MASS INDEX: 29.22 KG/M2 | WEIGHT: 235 LBS | HEIGHT: 75 IN

## 2021-11-02 DIAGNOSIS — M25.572 PAIN, JOINT, ANKLE AND FOOT, LEFT: ICD-10-CM

## 2021-11-02 DIAGNOSIS — S82.392D CLOSED FRACTURE OF POSTERIOR MALLEOLUS OF LEFT TIBIA WITH ROUTINE HEALING, SUBSEQUENT ENCOUNTER: Primary | ICD-10-CM

## 2021-11-02 PROCEDURE — 99213 OFFICE O/P EST LOW 20 MIN: CPT | Performed by: ORTHOPAEDIC SURGERY

## 2021-11-02 PROCEDURE — 73610 X-RAY EXAM OF ANKLE: CPT

## 2021-12-13 ENCOUNTER — TELEPHONE (OUTPATIENT)
Dept: FAMILY MEDICINE CLINIC | Facility: CLINIC | Age: 51
End: 2021-12-13

## 2021-12-13 ENCOUNTER — CLINICAL SUPPORT (OUTPATIENT)
Dept: FAMILY MEDICINE CLINIC | Facility: CLINIC | Age: 51
End: 2021-12-13

## 2021-12-13 DIAGNOSIS — Z11.59 ENCOUNTER FOR SCREENING FOR VIRAL DISEASE: Primary | ICD-10-CM

## 2021-12-13 PROCEDURE — U0003 INFECTIOUS AGENT DETECTION BY NUCLEIC ACID (DNA OR RNA); SEVERE ACUTE RESPIRATORY SYNDROME CORONAVIRUS 2 (SARS-COV-2) (CORONAVIRUS DISEASE [COVID-19]), AMPLIFIED PROBE TECHNIQUE, MAKING USE OF HIGH THROUGHPUT TECHNOLOGIES AS DESCRIBED BY CMS-2020-01-R: HCPCS | Performed by: FAMILY MEDICINE

## 2021-12-13 PROCEDURE — U0005 INFEC AGEN DETEC AMPLI PROBE: HCPCS | Performed by: FAMILY MEDICINE

## 2021-12-14 LAB — SARS-COV-2 RNA RESP QL NAA+PROBE: NEGATIVE

## 2022-03-23 ENCOUNTER — OFFICE VISIT (OUTPATIENT)
Dept: OBGYN CLINIC | Facility: CLINIC | Age: 52
End: 2022-03-23
Payer: OTHER MISCELLANEOUS

## 2022-03-23 VITALS — BODY MASS INDEX: 29.22 KG/M2 | HEIGHT: 75 IN | WEIGHT: 235 LBS

## 2022-03-23 DIAGNOSIS — S82.392D CLOSED FRACTURE OF POSTERIOR MALLEOLUS OF LEFT TIBIA WITH ROUTINE HEALING, SUBSEQUENT ENCOUNTER: Primary | ICD-10-CM

## 2022-03-23 PROCEDURE — 99213 OFFICE O/P EST LOW 20 MIN: CPT | Performed by: ORTHOPAEDIC SURGERY

## 2022-03-23 NOTE — PROGRESS NOTES
Arlys Cogan, M D  Attending, Orthopaedic Surgery  Foot and Ankle  [facility]        ORTHOPAEDIC FOOT AND ANKLE CLINIC VISIT-ANKLE FRACTURE     Assessment:     Encounter Diagnosis   Name Primary?  Closed fracture of posterior malleolus of left tibia with routine healing, subsequent encounter Yes              Plan:   The patient verbalized understanding of exam findings and treatment plan  We engaged in the shared decision-making process and treatment options were discussed at length with the patient  Surgical and conservative management discussed today along with risks and benefits  The posterior malleolus fracture has united, but patient's ankle is still under-rehabilitated as evidenced by  weakness compared to the contralateral ankle  Recommend dedicated physical therapy focusing on strengthening his lower leg musculature  New PT script provided today  He is having an issue with his workers compensation permitting him to participate in PT  He is being told by his worker compensation  that there is a form we would need to fill out in order to have PT  He does not have this form, and there is no record of any form being sent to our office or scanned into his chart  I am happy to fill out this form if he can provide it to us  He was given a referral to occupational medicine for continued care and management of this work related injury  At this point there is no surgical indication  MRI will be ordered to evaluate for osteochondral lesion of his ankle  He has deep ankle pain and pain on palpation with plantarflexion  RTC as needed, will call if any abnormality noted on MRI        History of Present Illness:   Chief Complaint: Left ankle pain  Rosalba Farnsworth is a 54 y o  male who is being seen for  left ankle pain  Patient has a history of left posterior malleolus fracture, DOI 5/14/2020  At that time he saw Dr Jasmine Camilo and was treated non-operatively   Since then he has continued to have pain  Pain is localized at the anterior and posterior aspects of the ankle, as well as the lateral leg just distal to the fibular head  There is no radiating pain  Pain is described as moderate and aching  minimal radiating and described as sharp and severe  Patient denies numbness, tingling or radicular pain in the ankle or foot, but does have some numbness over the lateral aspect of the leg  Denies history of neuropathy  Patient does not have diabetes and does not take blood thinners  Patient denies family history of anesthesia complications and has not had any complications with anesthesia  He states that he has not been able to attend PT due to issues with work       Pain/symptom timing:  Worse during the day when active  Pain/symptom context:  Worse with activites and work  Pain/symptom modifying factors:  Rest makes better, activities make worse  Pain/symptom associated signs/symptoms: none    Prior treatment   NSAIDsYes    Injections No   Bracing/Orthotics Yes   Physical Therapy Yes     Orthopedic Surgical History:   See below  Past Medical, Surgical and Social History:  Past Medical History:  has a past medical history of Asthma  Problem List: does not have any pertinent problems on file  Past Surgical History:  has a past surgical history that includes Colonoscopy  Family History: family history includes Cancer in his father; Diabetes in his mother  Social History:  reports that he has never smoked  He has never used smokeless tobacco  He reports current alcohol use  He reports that he does not use drugs  Current Medications: has a current medication list which includes the following prescription(s): albuterol, cyclobenzaprine, dicyclomine, hydrocortisone, hydrocortisone, ibuprofen, ibuprofen, omeprazole, and proair hfa  Allergies: has No Known Allergies       Review of Systems:  General- denies fever/chills  HEENT- denies hearing loss or sore throat  Eyes- denies eye pain or visual disturbances, denies red eyes  Respiratory- denies cough or SOB  Cardio- denies chest pain or palpitations  GI- denies abdominal pain  Endocrine- denies urinary frequency  Urinary- denies pain with urination  Musculoskeletal- Negative except noted above  Skin- denies rashes or wounds  Neurological- denies dizziness or headache  Psychiatric- denies anxiety or difficulty concentrating    Physical Exam:   Ht 6' 3" (1 905 m)   Wt 107 kg (235 lb)   BMI 29 37 kg/m²   General/Constitutional: No apparent distress: well-nourished and well developed  Eyes: normal ocular motion  Cardio: RRR, Normal S1S2, No m/r/g  Lymphatic: No appreciable lymphadenopathy  Respiratory: Non-labored breathing, CTA b/l no w/c/r  Vascular: No edema, swelling or tenderness, except as noted in detailed exam   Integumentary: No impressive skin lesions present, except as noted in detailed exam   Neuro: No ataxia or tremors noted  Psych: Normal mood and affect, oriented to person, place and time  Appropriate affect  Musculoskeletal: Normal, except as noted in detailed exam and in HPI  Examination    left    Gait normal   Musculoskeletal Mildly tender to palpation over anterior/ lateral aspect of the ankle,fibula     Skin Normal       Nails Normal    Range of Motion  10 dorsiflexion, 40 plantarflexion  Subtalar motion: normal    Stability No instability with anterior drawer    Muscle Strength Diffuse weakness compared to contralateral weakness  5-/5 tibialis anterior  5-/5 gastrocnemius-soleus  5-/5 posterior tibialis  5-/5 peroneal/eversion strength  5-/5 EHL  5-/5 FHL    Neurologic Normal    Sensation Some altered sensation to the lateral lower leg, but otherwise intact to light touch distally throughout sural, saphenous, superficial peroneal, deep peroneal and medial/lateral plantar nerve distributions  [place]-Louise 5 07 filament (10g) testing  deferred      Cardiovascular Brisk capillary refill < 2 seconds,intact DP and PT pulses    Special Tests None      Imaging Studies:   3 views of the  left ankle were taken 11/2/2021, reviewed and interpreted independently that demonstrate healed posterior malleolus fracture  No significant degenerative changes  Reviewed by me personally  Cleon Ginsberg Lachman, MD  Foot & Ankle Surgery   Department of [address] [place] [address]      I personally performed the service  Cleon Ginsberg Lachman, MD

## 2022-03-23 NOTE — PATIENT INSTRUCTIONS
Obtain the MRI of the ankle  Begin PT    Followup with occupational medicine for further management of this work related injury

## 2022-03-29 ENCOUNTER — TELEPHONE (OUTPATIENT)
Dept: URGENT CARE | Facility: CLINIC | Age: 52
End: 2022-03-29

## 2022-09-06 ENCOUNTER — APPOINTMENT (EMERGENCY)
Dept: VASCULAR ULTRASOUND | Facility: HOSPITAL | Age: 52
End: 2022-09-06
Payer: COMMERCIAL

## 2022-09-06 ENCOUNTER — APPOINTMENT (OUTPATIENT)
Dept: RADIOLOGY | Facility: HOSPITAL | Age: 52
End: 2022-09-06
Payer: COMMERCIAL

## 2022-09-06 ENCOUNTER — HOSPITAL ENCOUNTER (EMERGENCY)
Facility: HOSPITAL | Age: 52
Discharge: HOME | End: 2022-09-06
Attending: EMERGENCY MEDICINE
Payer: COMMERCIAL

## 2022-09-06 VITALS
OXYGEN SATURATION: 98 % | TEMPERATURE: 98 F | HEIGHT: 75 IN | HEART RATE: 66 BPM | DIASTOLIC BLOOD PRESSURE: 63 MMHG | RESPIRATION RATE: 16 BRPM | BODY MASS INDEX: 29.22 KG/M2 | WEIGHT: 235 LBS | SYSTOLIC BLOOD PRESSURE: 122 MMHG

## 2022-09-06 DIAGNOSIS — M79.606 LEG PAIN: Primary | ICD-10-CM

## 2022-09-06 DIAGNOSIS — R07.9 CHEST PAIN: ICD-10-CM

## 2022-09-06 LAB
2HR DELTA HS TROPONIN: 1 NG/L
ALBUMIN SERPL BCP-MCNC: 4.1 G/DL (ref 3.5–5)
ALP SERPL-CCNC: 48 U/L (ref 46–116)
ALT SERPL W P-5'-P-CCNC: 41 U/L (ref 12–78)
ANION GAP SERPL CALCULATED.3IONS-SCNC: 8 MMOL/L (ref 4–13)
AST SERPL W P-5'-P-CCNC: 27 U/L (ref 5–45)
ATRIAL RATE: 54 BPM
BASOPHILS # BLD AUTO: 0.04 THOUSANDS/ΜL (ref 0–0.1)
BASOPHILS NFR BLD AUTO: 1 % (ref 0–1)
BILIRUB SERPL-MCNC: 0.46 MG/DL (ref 0.2–1)
BUN SERPL-MCNC: 10 MG/DL (ref 5–25)
CALCIUM SERPL-MCNC: 9.2 MG/DL (ref 8.3–10.1)
CARDIAC TROPONIN I PNL SERPL HS: 5 NG/L
CARDIAC TROPONIN I PNL SERPL HS: 6 NG/L
CHLORIDE SERPL-SCNC: 101 MMOL/L (ref 96–108)
CO2 SERPL-SCNC: 31 MMOL/L (ref 21–32)
CREAT SERPL-MCNC: 1.13 MG/DL (ref 0.6–1.3)
D DIMER PPP FEU-MCNC: 0.29 UG/ML FEU
EOSINOPHIL # BLD AUTO: 0.08 THOUSAND/ΜL (ref 0–0.61)
EOSINOPHIL NFR BLD AUTO: 2 % (ref 0–6)
ERYTHROCYTE [DISTWIDTH] IN BLOOD BY AUTOMATED COUNT: 12.6 % (ref 11.6–15.1)
FLUAV RNA RESP QL NAA+PROBE: NEGATIVE
FLUBV RNA RESP QL NAA+PROBE: NEGATIVE
GFR SERPL CREATININE-BSD FRML MDRD: 75 ML/MIN/1.73SQ M
GLUCOSE SERPL-MCNC: 83 MG/DL (ref 65–140)
HCT VFR BLD AUTO: 50 % (ref 36.5–49.3)
HGB BLD-MCNC: 16.6 G/DL (ref 12–17)
IMM GRANULOCYTES # BLD AUTO: 0.01 THOUSAND/UL (ref 0–0.2)
IMM GRANULOCYTES NFR BLD AUTO: 0 % (ref 0–2)
LYMPHOCYTES # BLD AUTO: 2.51 THOUSANDS/ΜL (ref 0.6–4.47)
LYMPHOCYTES NFR BLD AUTO: 51 % (ref 14–44)
MCH RBC QN AUTO: 30.6 PG (ref 26.8–34.3)
MCHC RBC AUTO-ENTMCNC: 33.2 G/DL (ref 31.4–37.4)
MCV RBC AUTO: 92 FL (ref 82–98)
MONOCYTES # BLD AUTO: 0.41 THOUSAND/ΜL (ref 0.17–1.22)
MONOCYTES NFR BLD AUTO: 9 % (ref 4–12)
NEUTROPHILS # BLD AUTO: 1.77 THOUSANDS/ΜL (ref 1.85–7.62)
NEUTS SEG NFR BLD AUTO: 37 % (ref 43–75)
NRBC BLD AUTO-RTO: 0 /100 WBCS
P AXIS: 58 DEGREES
PLATELET # BLD AUTO: 221 THOUSANDS/UL (ref 149–390)
PMV BLD AUTO: 9.8 FL (ref 8.9–12.7)
POTASSIUM SERPL-SCNC: 4 MMOL/L (ref 3.5–5.3)
PR INTERVAL: 154 MS
PROT SERPL-MCNC: 8.6 G/DL (ref 6.4–8.4)
QRS AXIS: 43 DEGREES
QRSD INTERVAL: 82 MS
QT INTERVAL: 448 MS
QTC INTERVAL: 424 MS
RBC # BLD AUTO: 5.42 MILLION/UL (ref 3.88–5.62)
RSV RNA RESP QL NAA+PROBE: NEGATIVE
SARS-COV-2 RNA RESP QL NAA+PROBE: NEGATIVE
SODIUM SERPL-SCNC: 140 MMOL/L (ref 135–147)
T WAVE AXIS: 85 DEGREES
VENTRICULAR RATE: 54 BPM
WBC # BLD AUTO: 4.82 THOUSAND/UL (ref 4.31–10.16)

## 2022-09-06 PROCEDURE — 71046 X-RAY EXAM CHEST 2 VIEWS: CPT

## 2022-09-06 PROCEDURE — 93010 ELECTROCARDIOGRAM REPORT: CPT | Performed by: INTERNAL MEDICINE

## 2022-09-06 PROCEDURE — 99285 EMERGENCY DEPT VISIT HI MDM: CPT

## 2022-09-06 PROCEDURE — 80053 COMPREHEN METABOLIC PANEL: CPT | Performed by: EMERGENCY MEDICINE

## 2022-09-06 PROCEDURE — 85379 FIBRIN DEGRADATION QUANT: CPT | Performed by: EMERGENCY MEDICINE

## 2022-09-06 PROCEDURE — 84484 ASSAY OF TROPONIN QUANT: CPT | Performed by: EMERGENCY MEDICINE

## 2022-09-06 PROCEDURE — 36415 COLL VENOUS BLD VENIPUNCTURE: CPT | Performed by: EMERGENCY MEDICINE

## 2022-09-06 PROCEDURE — 99284 EMERGENCY DEPT VISIT MOD MDM: CPT | Performed by: EMERGENCY MEDICINE

## 2022-09-06 PROCEDURE — 85025 COMPLETE CBC W/AUTO DIFF WBC: CPT | Performed by: EMERGENCY MEDICINE

## 2022-09-06 PROCEDURE — 0241U HB NFCT DS VIR RESP RNA 4 TRGT: CPT | Performed by: EMERGENCY MEDICINE

## 2022-09-06 PROCEDURE — 93005 ELECTROCARDIOGRAM TRACING: CPT

## 2022-09-06 NOTE — ED PROVIDER NOTES
History  Chief Complaint   Patient presents with    Chest Pain     Pt reports pain in chest, in the back of right calf, and head starting 4 days ago  Feels like a pressure     54year old male pt comes to the ed with cc of right leg pain and chest discomfort and generalized malaise  History provided by:  Patient   used: No    Generalized Body Aches  Location:  The patient describes the pain from his foot all the way up his labs his into his chest and into his head  Severity:  Mild  Onset quality:  Gradual  Timing:  Constant  Chronicity:  New  Associated symptoms: no chest pain, no headaches and no myalgias        Prior to Admission Medications   Prescriptions Last Dose Informant Patient Reported? Taking? PROAIR  (90 Base) MCG/ACT inhaler  Self Yes No   Sig: inhale 2 puffs by mouth and INTO THE LUNGS every 6 hours if needed for wheezing   albuterol (2 5 mg/3 mL) 0 083 % nebulizer solution  Self Yes No   Sig: inhale contents of 1 vial ( 3 milliliters ) in nebulizer by mouth     (REFER TO PRESCRIPTION NOTES)     cyclobenzaprine (FLEXERIL) 10 mg tablet  Self No No   Sig: Take 1 tablet (10 mg total) by mouth 3 (three) times a day as needed for muscle spasms   dicyclomine (BENTYL) 20 mg tablet  Self No No   Sig: Take 1 tablet (20 mg total) by mouth every 6 (six) hours   hydrocortisone (ANUSOL-HC) 25 mg suppository  Self No No   Sig: Insert 1 suppository (25 mg total) into the rectum 2 (two) times a day   hydrocortisone (ANUSOL-HC, PROCTOSOL HC,) 2 5 % rectal cream  Self No No   Sig: Insert into the rectum 2 (two) times a day   ibuprofen (MOTRIN) 600 mg tablet  Self No No   Sig: Take 1 tablet (600 mg total) by mouth every 6 (six) hours as needed for mild pain   ibuprofen (MOTRIN) 800 mg tablet  Self Yes No   Sig: Take 800 mg by mouth 3 (three) times a day with meals   omeprazole (PriLOSEC) 40 MG capsule  Self Yes No   Sig: Take 40 mg by mouth daily      Facility-Administered Medications: None       Past Medical History:   Diagnosis Date    Asthma        Past Surgical History:   Procedure Laterality Date    COLONOSCOPY         Family History   Problem Relation Age of Onset    Diabetes Mother     Cancer Father      I have reviewed and agree with the history as documented  E-Cigarette/Vaping    E-Cigarette Use Former User      E-Cigarette/Vaping Substances    Nicotine No     THC No     CBD No     Flavoring No     Other No     Unknown No      Social History     Tobacco Use    Smoking status: Never Smoker    Smokeless tobacco: Never Used   Vaping Use    Vaping Use: Former   Substance Use Topics    Alcohol use: Yes     Comment: socially    Drug use: No       Review of Systems   Cardiovascular: Negative for chest pain  Musculoskeletal: Negative for myalgias  Neurological: Negative for headaches  All other systems reviewed and are negative  Physical Exam  Physical Exam  Vitals and nursing note reviewed  Constitutional:       General: He is not in acute distress  Appearance: He is well-developed  He is not diaphoretic  HENT:      Head: Normocephalic and atraumatic  Right Ear: External ear normal       Left Ear: External ear normal    Eyes:      General: No scleral icterus  Right eye: No discharge  Left eye: No discharge  Conjunctiva/sclera: Conjunctivae normal    Neck:      Thyroid: No thyromegaly  Vascular: No JVD  Trachea: No tracheal deviation  Cardiovascular:      Rate and Rhythm: Normal rate and regular rhythm  Pulmonary:      Effort: Pulmonary effort is normal  No respiratory distress  Breath sounds: Normal breath sounds  No stridor  No wheezing or rales  Abdominal:      General: Bowel sounds are normal  There is no distension  Palpations: Abdomen is soft  Tenderness: There is no abdominal tenderness  Musculoskeletal:         General: No tenderness or deformity  Normal range of motion  Cervical back: Normal range of motion and neck supple  Skin:     General: Skin is warm and dry  Neurological:      Mental Status: He is alert and oriented to person, place, and time  Cranial Nerves: No cranial nerve deficit  Coordination: Coordination normal    Psychiatric:         Behavior: Behavior normal          Vital Signs  ED Triage Vitals [09/06/22 1649]   Temperature Pulse Respirations Blood Pressure SpO2   98 °F (36 7 °C) (!) 51 18 132/64 98 %      Temp Source Heart Rate Source Patient Position - Orthostatic VS BP Location FiO2 (%)   Oral Monitor Sitting Left arm --      Pain Score       --           Vitals:    09/06/22 1649 09/06/22 2208   BP: 132/64 122/63   Pulse: (!) 51 66   Patient Position - Orthostatic VS: Sitting Sitting         Visual Acuity      ED Medications  Medications - No data to display    Diagnostic Studies  Results Reviewed     Procedure Component Value Units Date/Time    D-dimer, quantitative [[id number]] Collected: 09/06/22 1855    Lab Status:  In process Specimen: Blood from Arm, Left Updated: 09/06/22 2215    HS Troponin I 2hr [[id number]]  (Normal) Collected: 09/06/22 2055    Lab Status: Final result Specimen: Blood from Arm, Left Updated: 09/06/22 2139     hs TnI 2hr 6 ng/L      Delta 2hr hsTnI 1 ng/L     HS Troponin I 4hr [[id number]]     Lab Status: No result Specimen: Blood     HS Troponin 0hr (reflex protocol) [[id number]]  (Normal) Collected: 09/06/22 1853    Lab Status: Final result Specimen: Blood from Arm, Left Updated: 09/06/22 1923     hs TnI 0hr 5 ng/L     Comprehensive metabolic panel [[id number]]  (Abnormal) Collected: 09/06/22 1853    Lab Status: Final result Specimen: Blood from Arm, Left Updated: 09/06/22 1916     Sodium 140 mmol/L      Potassium 4 0 mmol/L      Chloride 101 mmol/L      CO2 31 mmol/L      ANION GAP 8 mmol/L      BUN 10 mg/dL      Creatinine 1 13 mg/dL      Glucose 83 mg/dL      Calcium 9 2 mg/dL      AST 27 U/L      ALT 41 U/L      Alkaline Phosphatase 48 U/L      Total Protein 8 6 g/dL      Albumin 4 1 g/dL      Total Bilirubin 0 46 mg/dL      eGFR 75 ml/min/1 73sq m     Narrative:      National Kidney Disease Foundation guidelines for Chronic Kidney Disease (CKD):     Stage 1 with normal or high GFR (GFR > 90 mL/min/1 73 square meters)    Stage 2 Mild CKD (GFR = 60-89 mL/min/1 73 square meters)    Stage 3A Moderate CKD (GFR = 45-59 mL/min/1 73 square meters)    Stage 3B Moderate CKD (GFR = 30-44 mL/min/1 73 square meters)    Stage 4 Severe CKD (GFR = 15-29 mL/min/1 73 square meters)    Stage 5 End Stage CKD (GFR <15 mL/min/1 73 square meters)  Note: GFR calculation is accurate only with a steady state creatinine    CBC and differential [[id number]]  (Abnormal) Collected: 09/06/22 1853    Lab Status: Final result Specimen: Blood from Arm, Left Updated: 09/06/22 1859     WBC 4 82 Thousand/uL      RBC 5 42 Million/uL      Hemoglobin 16 6 g/dL      Hematocrit 50 0 %      MCV 92 fL      MCH 30 6 pg      MCHC 33 2 g/dL      RDW 12 6 %      MPV 9 8 fL      Platelets 832 Thousands/uL      nRBC 0 /100 WBCs      Neutrophils Relative 37 %      Immat GRANS % 0 %      Lymphocytes Relative 51 %      Monocytes Relative 9 %      Eosinophils Relative 2 %      Basophils Relative 1 %      Neutrophils Absolute 1 77 Thousands/µL      Immature Grans Absolute 0 01 Thousand/uL      Lymphocytes Absolute 2 51 Thousands/µL      Monocytes Absolute 0 41 Thousand/µL      Eosinophils Absolute 0 08 Thousand/µL      Basophils Absolute 0 04 Thousands/µL     FLU/RSV/COVID - if FLU/RSV clinically relevant [[id number]]  (Normal) Collected: 09/06/22 1702    Lab Status: Final result Specimen: Nares from Nose Updated: 09/06/22 1740     SARS-CoV-2 Negative     INFLUENZA A PCR Negative     INFLUENZA B PCR Negative     RSV PCR Negative    Narrative:      FOR PEDIATRIC PATIENTS - copy/paste COVID Guidelines URL to browser: [email or web address]    SARS-CoV-2 assay is a Nucleic Acid Amplification assay intended for the  qualitative detection of nucleic acid from SARS-CoV-2 in nasopharyngeal  swabs  Results are for the presumptive identification of SARS-CoV-2 RNA  Positive results are indicative of infection with SARS-CoV-2, the virus  causing COVID-19, but do not rule out bacterial infection or co-infection  with other viruses  Laboratories within the [place] and its  territories are required to report all positive results to the appropriate  public health authorities  Negative results do not preclude SARS-CoV-2  infection and should not be used as the sole basis for treatment or other  patient management decisions  Negative results must be combined with  clinical observations, patient history, and epidemiological information  This test has not been FDA cleared or approved  This test has been authorized by FDA under an Emergency Use Authorization  (EUA)  This test is only authorized for the duration of time the  declaration that circumstances exist justifying the authorization of the  emergency use of an in vitro diagnostic tests for detection of SARS-CoV-2  virus and/or diagnosis of COVID-19 infection under section 564(b)(1) of  the Act, 21 U  S C  146KOK-0(H)(4), unless the authorization is terminated  or revoked sooner  The test has been validated but independent review by FDA  and CLIA is pending  Test performed using [employer] Gene[employer]pert: This RT-PCR assay targets N2,  a region unique to SARS-CoV-2  A conserved region in the E-gene was chosen  for pan-Sarbecovirus detection which includes SARS-CoV-2                   XR chest 2 views    (Results Pending)   VAS lower limb venous duplex study, unilateral/limited    (Results Pending)   VAS lower limb venous duplex study, unilateral/limited    (Results Pending)              Procedures  Procedures         ED Course HEART Risk Score    Flowsheet Row Most Recent Value   Heart Score Risk Calculator    History 0 Filed at: 09/06/2022 2206   ECG 1 Filed at: 09/06/2022 2206   Age 1 Filed at: 09/06/2022 2206   Risk Factors 1 Filed at: 09/06/2022 2206   Troponin 0 Filed at: 09/06/2022 2206   HEART Score 3 Filed at: 09/06/2022 2206                        SBIRT 20yo+    Flowsheet Row Most Recent Value   SBIRT (25 yo +)    In order to provide better care to our patients, we are screening all of our patients for alcohol and drug use  Would it be okay to ask you these screening questions? Yes Filed at: 09/06/2022 1856   Initial Alcohol Screen: US AUDIT-C     1  How often do you have a drink containing alcohol? 0 Filed at: 09/06/2022 1856   2  How many drinks containing alcohol do you have on a typical day you are drinking? 0 Filed at: 09/06/2022 1856   3a  Male UNDER 65: How often do you have five or more drinks on one occasion? 0 Filed at: 09/06/2022 1856   Audit-C Score 0 Filed at: 09/06/2022 1856   BRUCE: How many times in the past year have you    Used an illegal drug or used a prescription medication for non-medical reasons?  Never Filed at: 09/06/2022 1856                    [facility]  Number of Diagnoses or Management Options  Chest pain: new and requires workup  Leg pain: new and requires workup     Amount and/or Complexity of Data Reviewed  Decide to obtain previous medical records or to obtain history from someone other than the patient: yes  Review and summarize past medical records: yes    Patient Progress  Patient progress: stable      Disposition  Final diagnoses:   Chest pain   Leg pain     Time reflects when diagnosis was documented in both MDM as applicable and the Disposition within this note     Time User Action Codes Description Comment    9/6/2022 10:06 PM Adriana Chavis [R07 9] Chest pain     9/6/2022 10:16 PM Adriana Chavis [M79 606] Leg pain       ED Disposition     ED Disposition   Discharge    Condition Stable    Date/Time   Tue Sep 6, 2022 10:06 PM    Comment   Leonie Camacho discharge to home/self care  Follow-up Information     Follow up With Specialties Details Why Contact Info    Akilah Alegria MD Family Medicine   [address] [address] [phone]            Patient's Medications   Discharge Prescriptions    No medications on file       Outpatient Discharge Orders   VAS lower limb venous duplex study, unilateral/limited   Standing Status: Future Standing Exp   Date: 09/06/26       PDMP Review     None          ED Provider  Electronically Signed by           Chasity Lees DO  09/06/22 2215

## 2023-11-16 ENCOUNTER — OFFICE VISIT (OUTPATIENT)
Dept: URGENT CARE | Facility: CLINIC | Age: 53
End: 2023-11-16
Payer: COMMERCIAL

## 2023-11-16 VITALS
TEMPERATURE: 98.1 F | RESPIRATION RATE: 16 BRPM | OXYGEN SATURATION: 99 % | HEART RATE: 76 BPM | DIASTOLIC BLOOD PRESSURE: 75 MMHG | SYSTOLIC BLOOD PRESSURE: 156 MMHG

## 2023-11-16 DIAGNOSIS — L02.414 ABSCESS OF LEFT ARM: Primary | ICD-10-CM

## 2023-11-16 DIAGNOSIS — R22.32 SKIN LUMP OF ARM, LEFT: ICD-10-CM

## 2023-11-16 PROCEDURE — G0382 LEV 3 HOSP TYPE B ED VISIT: HCPCS | Performed by: PHYSICAL MEDICINE & REHABILITATION

## 2023-11-16 RX ORDER — CEPHALEXIN 500 MG/1
500 CAPSULE ORAL EVERY 6 HOURS SCHEDULED
Qty: 28 CAPSULE | Refills: 0 | Status: SHIPPED | OUTPATIENT
Start: 2023-11-16 | End: 2023-11-23

## 2023-11-16 RX ORDER — ASPIRIN 325 MG
325 TABLET, DELAYED RELEASE (ENTERIC COATED) ORAL EVERY 6 HOURS PRN
COMMUNITY

## 2023-11-16 NOTE — PATIENT INSTRUCTIONS
Please follow up with your PCP within 3-5 days   Left arm abscess: may apply warm compresses to the arm.  Use warm water to a wash cloth, compress with light pressure to affected area for 15 minutes at a time for up to 3-4x per day  Start Keflex today and complete 7 days of antibiotics  If area starts to enlarge and does not drain on its own, please return

## 2023-11-16 NOTE — PROGRESS NOTES
[facility]        NAME: Villa Younger is a 48 y.o. male  : 1970    MRN: [id number]  DATE: 2023  TIME: 3:34 PM    Assessment and Plan   Abscess of left arm [L02.414]  1. Abscess of left arm        2. Skin lump of arm, left  cephalexin (KEFLEX) 500 mg capsule        Monitor left arm abscess. Take Keflex x7 days. Monitor for erythema, fever, enlargement. May do warm compresses x15 minutes at a time 3-4 x daily. Return if area does not improve, enlarges or becomes erythematous      Patient Instructions       Follow up with PCP in 3-5 days. Proceed to  ER if symptoms worsen. Chief Complaint     Chief Complaint   Patient presents with    Arm Pain     Patient with lump on left arm x2 days. 8/10 pain. History of Present Illness       Patient presents with a lump to the left lateral antecubital region. The area is warm and tender to touch and causing a burning sensation down the right forearm. He denies numbness/tingling, difficulty moving the arm. Does have pain with arm movements. Arm Pain   Pertinent negatives include no chest pain. Review of Systems   Review of Systems   Constitutional:  Negative for fever. Respiratory:  Negative for shortness of breath. Cardiovascular:  Negative for chest pain. Skin:  Positive for wound. Lump to left arm, warm to touch, not red, painful with movements         Current Medications       Current Outpatient Medications:     albuterol (2.5 mg/3 mL) 0.083 % nebulizer solution, inhale contents of 1 vial ( 3 milliliters ) in nebulizer by mouth. ..  (REFER TO PRESCRIPTION NOTES). , Disp: , Rfl:     aspirin (ECOTRIN) 325 mg EC tablet, Take 325 mg by mouth every 6 (six) hours as needed for mild pain, Disp: , Rfl:     cephalexin (KEFLEX) 500 mg capsule, Take 1 capsule (500 mg total) by mouth every 6 (six) hours for 7 days, Disp: 28 capsule, Rfl: 0    cyclobenzaprine (FLEXERIL) 10 mg tablet, Take 1 tablet (10 mg total) by mouth 3 (three) times a day as needed for muscle spasms (Patient not taking: Reported on 11/16/2023), Disp: 20 tablet, Rfl: 0    dicyclomine (BENTYL) 20 mg tablet, Take 1 tablet (20 mg total) by mouth every 6 (six) hours (Patient not taking: Reported on 11/16/2023), Disp: 40 tablet, Rfl: 2    hydrocortisone (ANUSOL-HC) 25 mg suppository, Insert 1 suppository (25 mg total) into the rectum 2 (two) times a day (Patient not taking: Reported on 11/16/2023), Disp: 12 suppository, Rfl: 0    hydrocortisone (ANUSOL-HC, PROCTOSOL HC,) 2.5 % rectal cream, Insert into the rectum 2 (two) times a day (Patient not taking: Reported on 11/16/2023), Disp: 30 g, Rfl: 0    ibuprofen (MOTRIN) 600 mg tablet, Take 1 tablet (600 mg total) by mouth every 6 (six) hours as needed for mild pain (Patient not taking: Reported on 11/16/2023), Disp: 20 tablet, Rfl: 0    ibuprofen (MOTRIN) 800 mg tablet, Take 800 mg by mouth 3 (three) times a day with meals (Patient not taking: Reported on 11/16/2023), Disp: , Rfl: 0    omeprazole (PriLOSEC) 40 MG capsule, Take 40 mg by mouth daily (Patient not taking: Reported on 11/16/2023), Disp: , Rfl:     PROAIR  (90 Base) MCG/ACT inhaler, inhale 2 puffs by mouth and INTO THE LUNGS every 6 hours if needed for wheezing (Patient not taking: Reported on 11/16/2023), Disp: , Rfl:     Current Allergies     Allergies as of 11/16/2023    (No Known Allergies)            The following portions of the patient's history were reviewed and updated as appropriate: allergies, current medications, past family history, past medical history, past social history, past surgical history and problem list.     Past Medical History:   Diagnosis Date    Asthma        Past Surgical History:   Procedure Laterality Date    COLONOSCOPY      TOOTH EXTRACTION  2023       Family History   Problem Relation Age of Onset    Diabetes Mother     Cancer Father          Medications have been verified.         Objective   /75   Pulse 76 Temp 98.1 °F (36.7 °C)   Resp 16   SpO2 99%        Physical Exam     Physical Exam  Constitutional:       General: He is not in acute distress. Skin:     General: Skin is warm. Findings: No erythema. Comments: Small abscess noted to left lateral antecubitial region   Warm to touch in comparison to other arm, tender to palpate  Area is hard, moveable, with small pimple appearing at the center, does have dark discoloration surrounding the area. No visible white duration noted    Neurological:      Mental Status: He is alert.    Psychiatric:         Mood and Affect: Mood normal.         Behavior: Behavior normal.

## 2024-06-26 ENCOUNTER — TELEPHONE (OUTPATIENT)
Age: 54
End: 2024-06-26

## 2024-06-26 NOTE — TELEPHONE ENCOUNTER
Patient called for new patient appointment, offered soonest appointment in 05/2025.  Patient will seek treatment at another location.

## 2024-09-08 ENCOUNTER — OFFICE VISIT (OUTPATIENT)
Dept: URGENT CARE | Facility: CLINIC | Age: 54
End: 2024-09-08

## 2024-09-08 VITALS
DIASTOLIC BLOOD PRESSURE: 70 MMHG | BODY MASS INDEX: 28.1 KG/M2 | HEIGHT: 75 IN | SYSTOLIC BLOOD PRESSURE: 148 MMHG | HEART RATE: 66 BPM | WEIGHT: 226 LBS | OXYGEN SATURATION: 100 %

## 2024-09-08 DIAGNOSIS — R07.9 CHEST PAIN, UNSPECIFIED TYPE: Primary | ICD-10-CM

## 2024-09-08 PROCEDURE — G0382 LEV 3 HOSP TYPE B ED VISIT: HCPCS

## 2024-09-08 PROCEDURE — 93005 ELECTROCARDIOGRAM TRACING: CPT

## 2024-09-08 NOTE — PROGRESS NOTES
[facility]        NAME: Alvina Garcia is a 53 y.o. male  : 1970    MRN: [id number]  DATE: 2024  TIME: 3:14 PM    Assessment and Plan   Chest pain, unspecified type [R07.9]  1. Chest pain, unspecified type  ECG 12 lead        EKG- Sinus bradycardia with sinus arrhythmia. Otherwise normal EKG.     Intermittent chest and neck pain x 2 weeks. No active pain now. Mild reproducibility of pain in chest with palpation. Discussed further evaluation in ED, patient reports he doesn't feel he needs to go now but will present if the pain returns. Risks discussed and patient voiced understanding.     Patient Instructions       Follow up with PCP in 3-5 days.  Proceed to  ER if symptoms worsen.    If tests are performed, our office will contact you with results only if changes need to made to the care plan discussed with you at the visit. [facility]    Chief Complaint     Chief Complaint   Patient presents with    Chest Pain     Patient is Asthmatic;  2 weeks now, pressure on left side of chest and on the right side of neck. Hurts the worst in the morning time. Patient feels there is contributing factors to this pressure. He works in [place] and the commute is long, he does it 5 days a week. 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 Has been taking Baby aspirin but the pressure is still persistent on the neck and chest.         History of Present Illness       Chest Pain   This is a new problem. Episode onset: 2 weeks ago. The onset quality is sudden. The problem has been waxing and waning. The pain is present in the lateral region (L chest, R neck). The pain is mild. The quality of the pain is described as pressure. The pain radiates to the right neck. Pertinent negatives include no abdominal pain, cough, dizziness, fever, headaches, nausea, palpitations, shortness of breath or vomiting. Treatments tried: baby asprin. The  treatment provided no relief. Risk factors include male gender.       Review of Systems   Review of Systems   Constitutional:  Negative for chills and fever.   HENT:  Positive for congestion. Negative for postnasal drip, rhinorrhea, sinus pressure, sore throat and trouble swallowing.    Respiratory:  Negative for cough, chest tightness and shortness of breath.    Cardiovascular:  Positive for chest pain. Negative for palpitations.   Gastrointestinal:  Negative for abdominal pain, nausea and vomiting.   Genitourinary:  Negative for difficulty urinating.   Musculoskeletal:  Positive for arthralgias (R knee pain). Negative for myalgias.   Neurological:  Negative for dizziness and headaches.         Current Medications       Current Outpatient Medications:     albuterol (2.5 mg/3 mL) 0.083 % nebulizer solution, inhale contents of 1 vial ( 3 milliliters ) in nebulizer by mouth...  (REFER TO PRESCRIPTION NOTES)., Disp: , Rfl:     aspirin (ECOTRIN) 325 mg EC tablet, Take 325 mg by mouth every 6 (six) hours as needed for mild pain, Disp: , Rfl:     cyclobenzaprine (FLEXERIL) 10 mg tablet, Take 1 tablet (10 mg total) by mouth 3 (three) times a day as needed for muscle spasms (Patient not taking: Reported on 11/16/2023), Disp: 20 tablet, Rfl: 0    dicyclomine (BENTYL) 20 mg tablet, Take 1 tablet (20 mg total) by mouth every 6 (six) hours (Patient not taking: Reported on 11/16/2023), Disp: 40 tablet, Rfl: 2    hydrocortisone (ANUSOL-HC) 25 mg suppository, Insert 1 suppository (25 mg total) into the rectum 2 (two) times a day (Patient not taking: Reported on 11/16/2023), Disp: 12 suppository, Rfl: 0    hydrocortisone (ANUSOL-HC, PROCTOSOL HC,) 2.5 % rectal cream, Insert into the rectum 2 (two) times a day (Patient not taking: Reported on 11/16/2023), Disp: 30 g, Rfl: 0    ibuprofen (MOTRIN) 600 mg tablet, Take 1 tablet (600 mg total) by mouth every 6 (six) hours as needed for mild pain (Patient not taking: Reported on  "11/16/2023), Disp: 20 tablet, Rfl: 0    ibuprofen (MOTRIN) 800 mg tablet, Take 800 mg by mouth 3 (three) times a day with meals (Patient not taking: Reported on 11/16/2023), Disp: , Rfl: 0    omeprazole (PriLOSEC) 40 MG capsule, Take 40 mg by mouth daily (Patient not taking: Reported on 11/16/2023), Disp: , Rfl:     PROAIR  (90 Base) MCG/ACT inhaler, inhale 2 puffs by mouth and INTO THE LUNGS every 6 hours if needed for wheezing (Patient not taking: Reported on 11/16/2023), Disp: , Rfl:     Current Allergies     Allergies as of 09/08/2024    (No Known Allergies)            The following portions of the patient's history were reviewed and updated as appropriate: allergies, current medications, past family history, past medical history, past social history, past surgical history and problem list.     Past Medical History:   Diagnosis Date    Asthma        Past Surgical History:   Procedure Laterality Date    COLONOSCOPY      HERNIA REPAIR  02/27/2024    TOOTH EXTRACTION  2023       Family History   Problem Relation Age of Onset    Diabetes Mother     Cancer Father          Medications have been verified.        Objective   /70   Pulse 66   Ht 6' 3\" (1.905 m)   Wt 103 kg (226 lb)   SpO2 100%   BMI 28.25 kg/m²        Physical Exam     Physical Exam  Constitutional:       General: He is not in acute distress.     Appearance: He is not ill-appearing.   HENT:      Nose: Nose normal.      Mouth/Throat:      Mouth: Mucous membranes are moist.      Pharynx: Oropharynx is clear.   Eyes:      Pupils: Pupils are equal, round, and reactive to light.   Cardiovascular:      Rate and Rhythm: Normal rate and regular rhythm.      Pulses: Normal pulses.      Heart sounds: Normal heart sounds. Heart sounds not distant. No murmur heard.     No systolic murmur is present.      No diastolic murmur is present.      No gallop.   Pulmonary:      Effort: Pulmonary effort is normal. No respiratory distress.      Breath sounds: " Normal breath sounds. No wheezing.   Abdominal:      General: Abdomen is flat. Bowel sounds are normal. There is no distension.      Palpations: Abdomen is soft.      Tenderness: There is no abdominal tenderness.   Musculoskeletal:         General: Normal range of motion.      Cervical back: Normal range of motion.   Skin:     General: Skin is warm and dry.      Capillary Refill: Capillary refill takes less than 2 seconds.   Neurological:      Mental Status: He is alert and oriented to person, place, and time.

## 2024-09-09 LAB
ATRIAL RATE: 50 BPM
P AXIS: 32 DEGREES
PR INTERVAL: 164 MS
QRS AXIS: 39 DEGREES
QRSD INTERVAL: 92 MS
QT INTERVAL: 430 MS
QTC INTERVAL: 392 MS
T WAVE AXIS: 44 DEGREES
VENTRICULAR RATE: 50 BPM

## 2024-09-09 PROCEDURE — 93010 ELECTROCARDIOGRAM REPORT: CPT | Performed by: INTERNAL MEDICINE
